# Patient Record
Sex: FEMALE | Race: WHITE | NOT HISPANIC OR LATINO | Employment: FULL TIME | URBAN - METROPOLITAN AREA
[De-identification: names, ages, dates, MRNs, and addresses within clinical notes are randomized per-mention and may not be internally consistent; named-entity substitution may affect disease eponyms.]

---

## 2017-03-22 LAB
EXTERNAL ABO GROUPING: NORMAL
EXTERNAL ANTIBODY SCREEN: NEGATIVE
EXTERNAL HEPATITIS B SURFACE ANTIGEN: NEGATIVE
EXTERNAL HEPATITIS C AB: NEGATIVE
EXTERNAL RH FACTOR: POSITIVE
EXTERNAL RUBELLA QUALITATIVE: NORMAL
EXTERNAL SYPHILIS RPR SCREEN: NORMAL
HIV1 P24 AG SERPL QL IA: NEGATIVE

## 2017-08-28 ENCOUNTER — TRANSCRIBE ORDERS (OUTPATIENT)
Dept: DIABETES SERVICES | Facility: HOSPITAL | Age: 32
End: 2017-08-28

## 2017-08-28 DIAGNOSIS — O24.410 DIET CONTROLLED GESTATIONAL DIABETES MELLITUS (GDM) IN THIRD TRIMESTER: Primary | ICD-10-CM

## 2017-08-30 ENCOUNTER — HOSPITAL ENCOUNTER (OUTPATIENT)
Dept: DIABETES SERVICES | Facility: HOSPITAL | Age: 32
Discharge: HOME | End: 2017-08-30
Admitting: NURSE PRACTITIONER

## 2017-08-30 PROCEDURE — G0108 DIAB MANAGE TRN  PER INDIV: HCPCS

## 2017-10-13 LAB — EXTERNAL GROUP B STREP ANTIGEN: NEGATIVE

## 2017-11-09 ENCOUNTER — ANESTHESIA EVENT (OUTPATIENT)
Dept: LABOR AND DELIVERY | Facility: HOSPITAL | Age: 32
End: 2017-11-09

## 2017-11-09 ENCOUNTER — HOSPITAL ENCOUNTER (INPATIENT)
Facility: HOSPITAL | Age: 32
LOS: 3 days | Discharge: HOME | End: 2017-11-12
Attending: OBSTETRICS & GYNECOLOGY | Admitting: OBSTETRICS & GYNECOLOGY

## 2017-11-09 ENCOUNTER — ANESTHESIA (OUTPATIENT)
Dept: LABOR AND DELIVERY | Facility: HOSPITAL | Age: 32
End: 2017-11-09

## 2017-11-09 PROBLEM — Z34.90 PREGNANCY: Status: ACTIVE | Noted: 2017-11-09

## 2017-11-09 LAB
ABO GROUP BLD: NORMAL
ALBUMIN SERPL-MCNC: 3.6 G/DL (ref 3.5–5.2)
ALBUMIN/GLOB SERPL: 1.1 G/DL
ALP SERPL-CCNC: 212 U/L (ref 39–117)
ALT SERPL W P-5'-P-CCNC: 17 U/L (ref 1–33)
ANION GAP SERPL CALCULATED.3IONS-SCNC: 14.6 MMOL/L
AST SERPL-CCNC: 21 U/L (ref 1–32)
ATMOSPHERIC PRESS: 753.8 MMHG
ATMOSPHERIC PRESS: 754.3 MMHG
BASE EXCESS BLDCOA CALC-SCNC: -2.1 MMOL/L
BASE EXCESS BLDCOV CALC-SCNC: -2.4 MMOL/L (ref -30–30)
BASOPHILS # BLD AUTO: 0.02 10*3/MM3 (ref 0–0.2)
BASOPHILS NFR BLD AUTO: 0.2 % (ref 0–1.5)
BDY SITE: ABNORMAL
BDY SITE: ABNORMAL
BILIRUB SERPL-MCNC: 0.2 MG/DL (ref 0.1–1.2)
BLD GP AB SCN SERPL QL: NEGATIVE
BUN BLD-MCNC: 15 MG/DL (ref 6–20)
BUN/CREAT SERPL: 18.1 (ref 7–25)
CALCIUM SPEC-SCNC: 9.5 MG/DL (ref 8.6–10.5)
CHLORIDE SERPL-SCNC: 102 MMOL/L (ref 98–107)
CO2 SERPL-SCNC: 22.4 MMOL/L (ref 22–29)
CREAT BLD-MCNC: 0.83 MG/DL (ref 0.57–1)
DEPRECATED RDW RBC AUTO: 43.7 FL (ref 37–54)
EOSINOPHIL # BLD AUTO: 0.1 10*3/MM3 (ref 0–0.7)
EOSINOPHIL NFR BLD AUTO: 1.2 % (ref 0.3–6.2)
ERYTHROCYTE [DISTWIDTH] IN BLOOD BY AUTOMATED COUNT: 12.9 % (ref 11.7–13)
GAS FLOW AIRWAY: 3 LPM
GAS FLOW AIRWAY: 3 LPM
GFR SERPL CREATININE-BSD FRML MDRD: 80 ML/MIN/1.73
GLOBULIN UR ELPH-MCNC: 3.2 GM/DL
GLUCOSE BLD-MCNC: 81 MG/DL (ref 65–99)
HCO3 BLDCOA-SCNC: 26.2 MMOL/L (ref 22–28)
HCO3 BLDCOV-SCNC: 23.6 MMOL/L
HCT VFR BLD AUTO: 42.8 % (ref 35.6–45.5)
HGB BLD-MCNC: 14.4 G/DL (ref 11.9–15.5)
IMM GRANULOCYTES # BLD: 0.02 10*3/MM3 (ref 0–0.03)
IMM GRANULOCYTES NFR BLD: 0.2 % (ref 0–0.5)
LYMPHOCYTES # BLD AUTO: 1.44 10*3/MM3 (ref 0.9–4.8)
LYMPHOCYTES NFR BLD AUTO: 17.4 % (ref 19.6–45.3)
MCH RBC QN AUTO: 31.5 PG (ref 26.9–32)
MCHC RBC AUTO-ENTMCNC: 33.6 G/DL (ref 32.4–36.3)
MCV RBC AUTO: 93.7 FL (ref 80.5–98.2)
MODALITY: ABNORMAL
MODALITY: ABNORMAL
MONOCYTES # BLD AUTO: 0.6 10*3/MM3 (ref 0.2–1.2)
MONOCYTES NFR BLD AUTO: 7.3 % (ref 5–12)
NEUTROPHILS # BLD AUTO: 6.08 10*3/MM3 (ref 1.9–8.1)
NEUTROPHILS NFR BLD AUTO: 73.7 % (ref 42.7–76)
PCO2 BLDCOA: 56.8 MMHG
PCO2 BLDCOV: 43.9 MM HG (ref 35–51.3)
PH BLDCOA: 7.27 PH UNITS (ref 7.18–7.34)
PH BLDCOV: 7.34 PH UNITS (ref 7.26–7.4)
PLATELET # BLD AUTO: 178 10*3/MM3 (ref 140–500)
PMV BLD AUTO: 11.8 FL (ref 6–12)
PO2 BLDCOA: <25.2 MMHG (ref 12–26)
PO2 BLDCOV: 26.3 MM HG (ref 19–39)
POTASSIUM BLD-SCNC: 3.6 MMOL/L (ref 3.5–5.2)
PROT SERPL-MCNC: 6.8 G/DL (ref 6–8.5)
RBC # BLD AUTO: 4.57 10*6/MM3 (ref 3.9–5.2)
RH BLD: POSITIVE
SAO2 % BLDCOA: 24.3 % (ref 92–99)
SAO2 % BLDCOA: 44.5 % (ref 92–99)
SAO2 % BLDCOV: ABNORMAL %
SODIUM BLD-SCNC: 139 MMOL/L (ref 136–145)
WBC NRBC COR # BLD: 8.26 10*3/MM3 (ref 4.5–10.7)

## 2017-11-09 PROCEDURE — 85025 COMPLETE CBC W/AUTO DIFF WBC: CPT | Performed by: OBSTETRICS & GYNECOLOGY

## 2017-11-09 PROCEDURE — 86901 BLOOD TYPING SEROLOGIC RH(D): CPT | Performed by: OBSTETRICS & GYNECOLOGY

## 2017-11-09 PROCEDURE — 25010000003 MORPHINE PER 10 MG: Performed by: NURSE ANESTHETIST, CERTIFIED REGISTERED

## 2017-11-09 PROCEDURE — 86900 BLOOD TYPING SEROLOGIC ABO: CPT | Performed by: OBSTETRICS & GYNECOLOGY

## 2017-11-09 PROCEDURE — 25010000002 ONDANSETRON PER 1 MG: Performed by: NURSE ANESTHETIST, CERTIFIED REGISTERED

## 2017-11-09 PROCEDURE — C1755 CATHETER, INTRASPINAL: HCPCS | Performed by: ANESTHESIOLOGY

## 2017-11-09 PROCEDURE — 25010000003 CEFAZOLIN IN DEXTROSE 2-4 GM/100ML-% SOLUTION

## 2017-11-09 PROCEDURE — 25010000002 PROMETHAZINE PER 50 MG: Performed by: NURSE ANESTHETIST, CERTIFIED REGISTERED

## 2017-11-09 PROCEDURE — 82803 BLOOD GASES ANY COMBINATION: CPT

## 2017-11-09 PROCEDURE — 86850 RBC ANTIBODY SCREEN: CPT | Performed by: OBSTETRICS & GYNECOLOGY

## 2017-11-09 PROCEDURE — 25010000002 TERBUTALINE PER 1 MG: Performed by: OBSTETRICS & GYNECOLOGY

## 2017-11-09 PROCEDURE — 80053 COMPREHEN METABOLIC PANEL: CPT | Performed by: OBSTETRICS & GYNECOLOGY

## 2017-11-09 RX ORDER — DIPHENHYDRAMINE HYDROCHLORIDE 50 MG/ML
12.5 INJECTION INTRAMUSCULAR; INTRAVENOUS EVERY 8 HOURS PRN
Status: DISCONTINUED | OUTPATIENT
Start: 2017-11-09 | End: 2017-11-09 | Stop reason: HOSPADM

## 2017-11-09 RX ORDER — PROMETHAZINE HYDROCHLORIDE 12.5 MG/1
12.5 TABLET ORAL ONCE AS NEEDED
Status: ACTIVE | OUTPATIENT
Start: 2017-11-09 | End: 2017-11-10

## 2017-11-09 RX ORDER — ASPIRIN 81 MG/1
81 TABLET ORAL DAILY
COMMUNITY
End: 2017-11-12 | Stop reason: HOSPADM

## 2017-11-09 RX ORDER — EPHEDRINE SULFATE 50 MG/ML
5 INJECTION, SOLUTION INTRAVENOUS AS NEEDED
Status: DISCONTINUED | OUTPATIENT
Start: 2017-11-09 | End: 2017-11-09 | Stop reason: HOSPADM

## 2017-11-09 RX ORDER — OXYTOCIN/RINGER'S LACTATE 10/500ML
125 PLASTIC BAG, INJECTION (ML) INTRAVENOUS CONTINUOUS PRN
Status: DISCONTINUED | OUTPATIENT
Start: 2017-11-09 | End: 2017-11-09 | Stop reason: HOSPADM

## 2017-11-09 RX ORDER — PROMETHAZINE HYDROCHLORIDE 25 MG/1
25 SUPPOSITORY RECTAL ONCE AS NEEDED
Status: DISCONTINUED | OUTPATIENT
Start: 2017-11-09 | End: 2017-11-12 | Stop reason: HOSPADM

## 2017-11-09 RX ORDER — PROMETHAZINE HYDROCHLORIDE 25 MG/ML
12.5 INJECTION, SOLUTION INTRAMUSCULAR; INTRAVENOUS ONCE AS NEEDED
Status: DISCONTINUED | OUTPATIENT
Start: 2017-11-09 | End: 2017-11-12 | Stop reason: HOSPADM

## 2017-11-09 RX ORDER — OXYCODONE HYDROCHLORIDE AND ACETAMINOPHEN 5; 325 MG/1; MG/1
1 TABLET ORAL EVERY 4 HOURS PRN
Status: DISCONTINUED | OUTPATIENT
Start: 2017-11-09 | End: 2017-11-09 | Stop reason: HOSPADM

## 2017-11-09 RX ORDER — MINERAL OIL
OIL (ML) MISCELLANEOUS ONCE
Status: DISCONTINUED | OUTPATIENT
Start: 2017-11-09 | End: 2017-11-09 | Stop reason: HOSPADM

## 2017-11-09 RX ORDER — LANOLIN 100 %
OINTMENT (GRAM) TOPICAL
Status: DISCONTINUED | OUTPATIENT
Start: 2017-11-09 | End: 2017-11-12 | Stop reason: HOSPADM

## 2017-11-09 RX ORDER — TERBUTALINE SULFATE 1 MG/ML
0.25 INJECTION, SOLUTION SUBCUTANEOUS AS NEEDED
Status: DISCONTINUED | OUTPATIENT
Start: 2017-11-09 | End: 2017-11-09 | Stop reason: HOSPADM

## 2017-11-09 RX ORDER — NALOXONE HCL 0.4 MG/ML
0.2 VIAL (ML) INJECTION AS NEEDED
Status: DISCONTINUED | OUTPATIENT
Start: 2017-11-09 | End: 2017-11-12 | Stop reason: HOSPADM

## 2017-11-09 RX ORDER — PROMETHAZINE HYDROCHLORIDE 12.5 MG/1
12.5 SUPPOSITORY RECTAL EVERY 6 HOURS PRN
Status: DISCONTINUED | OUTPATIENT
Start: 2017-11-09 | End: 2017-11-09 | Stop reason: HOSPADM

## 2017-11-09 RX ORDER — DOCUSATE SODIUM 100 MG/1
100 CAPSULE, LIQUID FILLED ORAL 2 TIMES DAILY PRN
Status: DISCONTINUED | OUTPATIENT
Start: 2017-11-09 | End: 2017-11-12 | Stop reason: HOSPADM

## 2017-11-09 RX ORDER — HYDROCODONE BITARTRATE AND ACETAMINOPHEN 7.5; 325 MG/1; MG/1
1 TABLET ORAL ONCE AS NEEDED
Status: CANCELLED | OUTPATIENT
Start: 2017-11-09 | End: 2017-11-10

## 2017-11-09 RX ORDER — LIDOCAINE HYDROCHLORIDE 15 MG/ML
INJECTION, SOLUTION EPIDURAL; INFILTRATION; INTRACAUDAL; PERINEURAL AS NEEDED
Status: DISCONTINUED | OUTPATIENT
Start: 2017-11-09 | End: 2017-11-09 | Stop reason: SURG

## 2017-11-09 RX ORDER — ONDANSETRON 4 MG/1
4 TABLET, FILM COATED ORAL EVERY 8 HOURS PRN
Status: DISCONTINUED | OUTPATIENT
Start: 2017-11-09 | End: 2017-11-12 | Stop reason: HOSPADM

## 2017-11-09 RX ORDER — LIDOCAINE HYDROCHLORIDE 20 MG/ML
INJECTION, SOLUTION EPIDURAL; INFILTRATION; INTRACAUDAL; PERINEURAL AS NEEDED
Status: DISCONTINUED | OUTPATIENT
Start: 2017-11-09 | End: 2017-11-09 | Stop reason: SURG

## 2017-11-09 RX ORDER — ONDANSETRON 2 MG/ML
INJECTION INTRAMUSCULAR; INTRAVENOUS AS NEEDED
Status: DISCONTINUED | OUTPATIENT
Start: 2017-11-09 | End: 2017-11-09 | Stop reason: SURG

## 2017-11-09 RX ORDER — DIPHENHYDRAMINE HCL 25 MG
25 CAPSULE ORAL NIGHTLY PRN
Status: DISCONTINUED | OUTPATIENT
Start: 2017-11-09 | End: 2017-11-09 | Stop reason: HOSPADM

## 2017-11-09 RX ORDER — DEXTROSE, SODIUM CHLORIDE, SODIUM LACTATE, POTASSIUM CHLORIDE, AND CALCIUM CHLORIDE 5; .6; .31; .03; .02 G/100ML; G/100ML; G/100ML; G/100ML; G/100ML
125 INJECTION, SOLUTION INTRAVENOUS CONTINUOUS
Status: DISCONTINUED | OUTPATIENT
Start: 2017-11-09 | End: 2017-11-09

## 2017-11-09 RX ORDER — ONDANSETRON 4 MG/1
4 TABLET, ORALLY DISINTEGRATING ORAL EVERY 6 HOURS PRN
Status: DISCONTINUED | OUTPATIENT
Start: 2017-11-09 | End: 2017-11-09 | Stop reason: HOSPADM

## 2017-11-09 RX ORDER — METHYLERGONOVINE MALEATE 0.2 MG/ML
200 INJECTION INTRAVENOUS ONCE AS NEEDED
Status: DISCONTINUED | OUTPATIENT
Start: 2017-11-09 | End: 2017-11-09 | Stop reason: HOSPADM

## 2017-11-09 RX ORDER — OXYCODONE HYDROCHLORIDE AND ACETAMINOPHEN 5; 325 MG/1; MG/1
2 TABLET ORAL EVERY 4 HOURS PRN
Status: DISCONTINUED | OUTPATIENT
Start: 2017-11-09 | End: 2017-11-09 | Stop reason: HOSPADM

## 2017-11-09 RX ORDER — PROMETHAZINE HYDROCHLORIDE 25 MG/ML
12.5 INJECTION, SOLUTION INTRAMUSCULAR; INTRAVENOUS EVERY 6 HOURS PRN
Status: DISCONTINUED | OUTPATIENT
Start: 2017-11-09 | End: 2017-11-09 | Stop reason: HOSPADM

## 2017-11-09 RX ORDER — IBUPROFEN 600 MG/1
600 TABLET ORAL EVERY 8 HOURS PRN
Status: DISCONTINUED | OUTPATIENT
Start: 2017-11-09 | End: 2017-11-11

## 2017-11-09 RX ORDER — ACETAMINOPHEN 500 MG
1000 TABLET ORAL EVERY 4 HOURS PRN
Status: DISCONTINUED | OUTPATIENT
Start: 2017-11-09 | End: 2017-11-09 | Stop reason: HOSPADM

## 2017-11-09 RX ORDER — OXYTOCIN/RINGER'S LACTATE 10/500ML
2-20 PLASTIC BAG, INJECTION (ML) INTRAVENOUS
Status: DISCONTINUED | OUTPATIENT
Start: 2017-11-09 | End: 2017-11-09

## 2017-11-09 RX ORDER — ERYTHROMYCIN 5 MG/G
OINTMENT OPHTHALMIC
Status: DISPENSED
Start: 2017-11-09 | End: 2017-11-10

## 2017-11-09 RX ORDER — CEFAZOLIN SODIUM 2 G/100ML
INJECTION, SOLUTION INTRAVENOUS
Status: COMPLETED
Start: 2017-11-09 | End: 2017-11-09

## 2017-11-09 RX ORDER — SODIUM CHLORIDE 9 MG/ML
100 INJECTION, SOLUTION INTRAVENOUS CONTINUOUS
Status: DISCONTINUED | OUTPATIENT
Start: 2017-11-09 | End: 2017-11-09

## 2017-11-09 RX ORDER — FLUMAZENIL 0.1 MG/ML
0.2 INJECTION INTRAVENOUS AS NEEDED
Status: CANCELLED | OUTPATIENT
Start: 2017-11-09

## 2017-11-09 RX ORDER — PRENATAL VIT NO.126/IRON/FOLIC 28MG-0.8MG
1 TABLET ORAL
COMMUNITY

## 2017-11-09 RX ORDER — OXYCODONE AND ACETAMINOPHEN 7.5; 325 MG/1; MG/1
1 TABLET ORAL EVERY 4 HOURS PRN
Status: DISCONTINUED | OUTPATIENT
Start: 2017-11-09 | End: 2017-11-12 | Stop reason: HOSPADM

## 2017-11-09 RX ORDER — ONDANSETRON 2 MG/ML
4 INJECTION INTRAMUSCULAR; INTRAVENOUS ONCE AS NEEDED
Status: DISCONTINUED | OUTPATIENT
Start: 2017-11-09 | End: 2017-11-09 | Stop reason: HOSPADM

## 2017-11-09 RX ORDER — FAMOTIDINE 10 MG/ML
20 INJECTION, SOLUTION INTRAVENOUS ONCE AS NEEDED
Status: DISCONTINUED | OUTPATIENT
Start: 2017-11-09 | End: 2017-11-09 | Stop reason: HOSPADM

## 2017-11-09 RX ORDER — PROMETHAZINE HYDROCHLORIDE 25 MG/ML
INJECTION, SOLUTION INTRAMUSCULAR; INTRAVENOUS AS NEEDED
Status: DISCONTINUED | OUTPATIENT
Start: 2017-11-09 | End: 2017-11-09 | Stop reason: SURG

## 2017-11-09 RX ORDER — PROMETHAZINE HYDROCHLORIDE 25 MG/1
12.5 TABLET ORAL EVERY 6 HOURS PRN
Status: DISCONTINUED | OUTPATIENT
Start: 2017-11-09 | End: 2017-11-09 | Stop reason: HOSPADM

## 2017-11-09 RX ORDER — MISOPROSTOL 200 UG/1
800 TABLET ORAL AS NEEDED
Status: DISCONTINUED | OUTPATIENT
Start: 2017-11-09 | End: 2017-11-09 | Stop reason: HOSPADM

## 2017-11-09 RX ORDER — HYDROMORPHONE HYDROCHLORIDE 1 MG/ML
0.5 INJECTION, SOLUTION INTRAMUSCULAR; INTRAVENOUS; SUBCUTANEOUS
Status: CANCELLED | OUTPATIENT
Start: 2017-11-09

## 2017-11-09 RX ORDER — FAMOTIDINE 10 MG/ML
20 INJECTION, SOLUTION INTRAVENOUS EVERY 12 HOURS SCHEDULED
Status: DISCONTINUED | OUTPATIENT
Start: 2017-11-09 | End: 2017-11-09 | Stop reason: HOSPADM

## 2017-11-09 RX ORDER — HYDRALAZINE HYDROCHLORIDE 20 MG/ML
5 INJECTION INTRAMUSCULAR; INTRAVENOUS
Status: CANCELLED | OUTPATIENT
Start: 2017-11-09

## 2017-11-09 RX ORDER — DIPHENHYDRAMINE HYDROCHLORIDE 50 MG/ML
25 INJECTION INTRAMUSCULAR; INTRAVENOUS NIGHTLY PRN
Status: DISCONTINUED | OUTPATIENT
Start: 2017-11-09 | End: 2017-11-09 | Stop reason: HOSPADM

## 2017-11-09 RX ORDER — MORPHINE SULFATE 0.5 MG/ML
INJECTION, SOLUTION EPIDURAL; INTRATHECAL; INTRAVENOUS AS NEEDED
Status: DISCONTINUED | OUTPATIENT
Start: 2017-11-09 | End: 2017-11-09 | Stop reason: SURG

## 2017-11-09 RX ORDER — MINERAL OIL
30 OIL (ML) MISCELLANEOUS AS NEEDED
Status: DISCONTINUED | OUTPATIENT
Start: 2017-11-09 | End: 2017-11-09 | Stop reason: HOSPADM

## 2017-11-09 RX ORDER — ACETAMINOPHEN 325 MG/1
650 TABLET ORAL EVERY 4 HOURS PRN
Status: DISCONTINUED | OUTPATIENT
Start: 2017-11-09 | End: 2017-11-12 | Stop reason: HOSPADM

## 2017-11-09 RX ORDER — LABETALOL 100 MG/1
100 TABLET, FILM COATED ORAL 2 TIMES DAILY
COMMUNITY
End: 2017-11-12 | Stop reason: HOSPADM

## 2017-11-09 RX ORDER — ACETAMINOPHEN 650 MG/1
650 SUPPOSITORY RECTAL EVERY 4 HOURS PRN
Status: DISCONTINUED | OUTPATIENT
Start: 2017-11-09 | End: 2017-11-09 | Stop reason: HOSPADM

## 2017-11-09 RX ORDER — FENTANYL CITRATE 50 UG/ML
50 INJECTION, SOLUTION INTRAMUSCULAR; INTRAVENOUS
Status: CANCELLED | OUTPATIENT
Start: 2017-11-09

## 2017-11-09 RX ORDER — PHYTONADIONE 2 MG/ML
INJECTION, EMULSION INTRAMUSCULAR; INTRAVENOUS; SUBCUTANEOUS
Status: DISPENSED
Start: 2017-11-09 | End: 2017-11-10

## 2017-11-09 RX ORDER — SODIUM CHLORIDE 0.9 % (FLUSH) 0.9 %
1-10 SYRINGE (ML) INJECTION AS NEEDED
Status: DISCONTINUED | OUTPATIENT
Start: 2017-11-09 | End: 2017-11-09 | Stop reason: HOSPADM

## 2017-11-09 RX ORDER — ONDANSETRON 2 MG/ML
4 INJECTION INTRAMUSCULAR; INTRAVENOUS EVERY 6 HOURS PRN
Status: DISCONTINUED | OUTPATIENT
Start: 2017-11-09 | End: 2017-11-09 | Stop reason: HOSPADM

## 2017-11-09 RX ORDER — NALBUPHINE HCL 10 MG/ML
5 AMPUL (ML) INJECTION
Status: DISCONTINUED | OUTPATIENT
Start: 2017-11-09 | End: 2017-11-09 | Stop reason: HOSPADM

## 2017-11-09 RX ORDER — OXYCODONE AND ACETAMINOPHEN 7.5; 325 MG/1; MG/1
1 TABLET ORAL ONCE AS NEEDED
Status: CANCELLED | OUTPATIENT
Start: 2017-11-09 | End: 2017-11-10

## 2017-11-09 RX ORDER — MISOPROSTOL 200 UG/1
600 TABLET ORAL ONCE
Status: COMPLETED | OUTPATIENT
Start: 2017-11-09 | End: 2017-11-09

## 2017-11-09 RX ORDER — EPHEDRINE SULFATE 50 MG/ML
5 INJECTION, SOLUTION INTRAVENOUS ONCE AS NEEDED
Status: CANCELLED | OUTPATIENT
Start: 2017-11-09

## 2017-11-09 RX ORDER — LIDOCAINE HYDROCHLORIDE 10 MG/ML
5 INJECTION, SOLUTION INFILTRATION; PERINEURAL AS NEEDED
Status: DISCONTINUED | OUTPATIENT
Start: 2017-11-09 | End: 2017-11-09 | Stop reason: HOSPADM

## 2017-11-09 RX ORDER — PROMETHAZINE HYDROCHLORIDE 25 MG/1
25 TABLET ORAL ONCE AS NEEDED
Status: DISCONTINUED | OUTPATIENT
Start: 2017-11-09 | End: 2017-11-12 | Stop reason: HOSPADM

## 2017-11-09 RX ORDER — LABETALOL HYDROCHLORIDE 5 MG/ML
5 INJECTION, SOLUTION INTRAVENOUS
Status: CANCELLED | OUTPATIENT
Start: 2017-11-09

## 2017-11-09 RX ORDER — CEFAZOLIN SODIUM 2 G/100ML
2 INJECTION, SOLUTION INTRAVENOUS ONCE
Status: COMPLETED | OUTPATIENT
Start: 2017-11-09 | End: 2017-11-09

## 2017-11-09 RX ORDER — OXYCODONE HYDROCHLORIDE AND ACETAMINOPHEN 5; 325 MG/1; MG/1
1 TABLET ORAL EVERY 4 HOURS PRN
Status: DISCONTINUED | OUTPATIENT
Start: 2017-11-09 | End: 2017-11-12 | Stop reason: HOSPADM

## 2017-11-09 RX ORDER — OXYTOCIN/RINGER'S LACTATE 10/500ML
999 PLASTIC BAG, INJECTION (ML) INTRAVENOUS ONCE
Status: COMPLETED | OUTPATIENT
Start: 2017-11-09 | End: 2017-11-09

## 2017-11-09 RX ORDER — SODIUM CHLORIDE, SODIUM LACTATE, POTASSIUM CHLORIDE, CALCIUM CHLORIDE 600; 310; 30; 20 MG/100ML; MG/100ML; MG/100ML; MG/100ML
125 INJECTION, SOLUTION INTRAVENOUS CONTINUOUS
Status: DISCONTINUED | OUTPATIENT
Start: 2017-11-09 | End: 2017-11-09

## 2017-11-09 RX ORDER — ZOLPIDEM TARTRATE 5 MG/1
5 TABLET ORAL NIGHTLY PRN
Status: DISCONTINUED | OUTPATIENT
Start: 2017-11-09 | End: 2017-11-09 | Stop reason: HOSPADM

## 2017-11-09 RX ORDER — FAMOTIDINE 20 MG/1
20 TABLET, FILM COATED ORAL EVERY 12 HOURS SCHEDULED
Status: DISCONTINUED | OUTPATIENT
Start: 2017-11-09 | End: 2017-11-09 | Stop reason: HOSPADM

## 2017-11-09 RX ORDER — SIMETHICONE 80 MG
80 TABLET,CHEWABLE ORAL 4 TIMES DAILY PRN
Status: DISCONTINUED | OUTPATIENT
Start: 2017-11-09 | End: 2017-11-12 | Stop reason: HOSPADM

## 2017-11-09 RX ORDER — ONDANSETRON 4 MG/1
4 TABLET, FILM COATED ORAL EVERY 6 HOURS PRN
Status: DISCONTINUED | OUTPATIENT
Start: 2017-11-09 | End: 2017-11-09 | Stop reason: HOSPADM

## 2017-11-09 RX ORDER — CARBOPROST TROMETHAMINE 250 UG/ML
250 INJECTION, SOLUTION INTRAMUSCULAR AS NEEDED
Status: DISCONTINUED | OUTPATIENT
Start: 2017-11-09 | End: 2017-11-09 | Stop reason: HOSPADM

## 2017-11-09 RX ORDER — IBUPROFEN 600 MG/1
600 TABLET ORAL EVERY 6 HOURS PRN
Status: DISCONTINUED | OUTPATIENT
Start: 2017-11-09 | End: 2017-11-09 | Stop reason: HOSPADM

## 2017-11-09 RX ADMIN — OXYCODONE HYDROCHLORIDE AND ACETAMINOPHEN 1 TABLET: 5; 325 TABLET ORAL at 15:19

## 2017-11-09 RX ADMIN — CEFAZOLIN SODIUM 2 G: 2 INJECTION, SOLUTION INTRAVENOUS at 12:55

## 2017-11-09 RX ADMIN — TERBUTALINE SULFATE 0.25 MG: 1 INJECTION, SOLUTION SUBCUTANEOUS at 10:42

## 2017-11-09 RX ADMIN — ONDANSETRON 4 MG: 2 INJECTION INTRAMUSCULAR; INTRAVENOUS at 13:02

## 2017-11-09 RX ADMIN — LIDOCAINE HYDROCHLORIDE 3 ML: 15 INJECTION, SOLUTION EPIDURAL; INFILTRATION; INTRACAUDAL; PERINEURAL at 09:59

## 2017-11-09 RX ADMIN — LIDOCAINE HYDROCHLORIDE 4 ML: 15 INJECTION, SOLUTION EPIDURAL; INFILTRATION; INTRACAUDAL; PERINEURAL at 10:01

## 2017-11-09 RX ADMIN — OXYTOCIN 500 ML: 10 INJECTION, SOLUTION INTRAMUSCULAR; INTRAVENOUS at 13:46

## 2017-11-09 RX ADMIN — LIDOCAINE HYDROCHLORIDE 5 ML: 20 INJECTION, SOLUTION EPIDURAL; INFILTRATION; INTRACAUDAL; PERINEURAL at 12:53

## 2017-11-09 RX ADMIN — IBUPROFEN 600 MG: 600 TABLET ORAL at 15:18

## 2017-11-09 RX ADMIN — PROMETHAZINE HYDROCHLORIDE 6.25 MG: 25 INJECTION INTRAMUSCULAR; INTRAVENOUS at 13:28

## 2017-11-09 RX ADMIN — LIDOCAINE HYDROCHLORIDE 5 ML: 20 INJECTION, SOLUTION EPIDURAL; INFILTRATION; INTRACAUDAL; PERINEURAL at 12:51

## 2017-11-09 RX ADMIN — MORPHINE SULFATE 100 MCG: 0.5 INJECTION EPIDURAL; INTRATHECAL; INTRAVENOUS at 13:47

## 2017-11-09 RX ADMIN — OXYTOCIN 2 MILLI-UNITS/MIN: 10 INJECTION, SOLUTION INTRAMUSCULAR; INTRAVENOUS at 08:12

## 2017-11-09 RX ADMIN — MORPHINE SULFATE 100 MCG: 0.5 INJECTION EPIDURAL; INTRATHECAL; INTRAVENOUS at 13:31

## 2017-11-09 RX ADMIN — Medication 10 ML/HR: at 10:06

## 2017-11-09 RX ADMIN — SODIUM CHLORIDE, POTASSIUM CHLORIDE, SODIUM LACTATE AND CALCIUM CHLORIDE 125 ML/HR: 600; 310; 30; 20 INJECTION, SOLUTION INTRAVENOUS at 06:25

## 2017-11-09 RX ADMIN — TERBUTALINE SULFATE 0.25 MG: 1 INJECTION, SOLUTION SUBCUTANEOUS at 12:20

## 2017-11-09 RX ADMIN — LIDOCAINE HYDROCHLORIDE 5 ML: 20 INJECTION, SOLUTION EPIDURAL; INFILTRATION; INTRACAUDAL; PERINEURAL at 12:48

## 2017-11-09 RX ADMIN — SODIUM CHLORIDE, POTASSIUM CHLORIDE, SODIUM LACTATE AND CALCIUM CHLORIDE: 600; 310; 30; 20 INJECTION, SOLUTION INTRAVENOUS at 13:52

## 2017-11-09 RX ADMIN — MISOPROSTOL 600 MCG: 200 TABLET ORAL at 22:05

## 2017-11-09 RX ADMIN — MORPHINE SULFATE 100 MCG: 0.5 INJECTION EPIDURAL; INTRATHECAL; INTRAVENOUS at 13:22

## 2017-11-09 RX ADMIN — OXYTOCIN 500 ML: 10 INJECTION, SOLUTION INTRAMUSCULAR; INTRAVENOUS at 13:14

## 2017-11-09 RX ADMIN — SODIUM CHLORIDE, POTASSIUM CHLORIDE, SODIUM LACTATE AND CALCIUM CHLORIDE 125 ML/HR: 600; 310; 30; 20 INJECTION, SOLUTION INTRAVENOUS at 11:03

## 2017-11-09 RX ADMIN — LIDOCAINE HYDROCHLORIDE 2 ML: 15 INJECTION, SOLUTION EPIDURAL; INFILTRATION; INTRACAUDAL; PERINEURAL at 10:03

## 2017-11-09 RX ADMIN — SODIUM CHLORIDE, POTASSIUM CHLORIDE, SODIUM LACTATE AND CALCIUM CHLORIDE 1000 ML: 600; 310; 30; 20 INJECTION, SOLUTION INTRAVENOUS at 09:29

## 2017-11-09 NOTE — PLAN OF CARE
Problem: Patient Care Overview (Adult)  Goal: Plan of Care Review  Outcome: Ongoing (interventions implemented as appropriate)    11/09/17 0651   Coping/Psychosocial Response Interventions   Plan Of Care Reviewed With patient;spouse   Patient Care Overview   Progress improving       Goal: Adult Individualization and Mutuality  Outcome: Ongoing (interventions implemented as appropriate)    11/09/17 0651   Individualization   Patient Specific Preferences Epidural, breastfeeding, LULA, fob to cut cord   Patient Specific Goals Pain management, LULA immediately following delivery, breastfeeding within one hour of delivery.   Patient Specific Interventions IV fluids, position changes   Mutuality/Individual Preferences   What Anxieties, Fears or Concerns Do You Have About Your Health or Care? denies   What Questions Do You Have About Your Health or Care? Questions about epidural addressed.   What Information Would Help Us Give You More Personalized Care? Inclusion in plan of care       Goal: Discharge Needs Assessment  Outcome: Ongoing (interventions implemented as appropriate)    11/09/17 0651   Discharge Needs Assessment   Concerns To Be Addressed no discharge needs identified   Readmission Within The Last 30 Days no previous admission in last 30 days   Equipment Needed After Discharge none   Discharge Disposition home or self-care   Current Health   Outpatient/Agency/Support Group Needs physician   Anticipated Changes Related to Illness none   Self-Care   Equipment Currently Used at Home none   Living Environment   Transportation Available car         Problem: Labor (Cervical Ripen, Induct, Augment) (Adult,Obstetrics,Pediatric)  Goal: Signs and Symptoms of Listed Potential Problems Will be Absent or Manageable (Labor)  Outcome: Ongoing (interventions implemented as appropriate)    11/09/17 0651   Labor (Cervical Ripen, Induct, Augment)   Problems Assessed (Labor) all   Problems Present (Labor) pain

## 2017-11-09 NOTE — H&P
[facility]  Obstetric Preop History and Physical:    Patient Name: Magno Maria  :  1985  MRN:  [id number]      Chief Complaint   Patient presents with   • Rupture of Membranes     States had gush of fluid at home. Membranes felt intact on admission but mod amt bloody show with cervical change since office visit        Subjective                32 y.o. female  currently at 40w2d, who presented in early labor then progress to active labor, but multiple episodes of deep variables, followed by bradycardic episode and now minimal variability.              Past OB History:       Obstetric History       T0      L0     SAB0   TAB0   Ectopic0   Multiple0   Live Births0       # Outcome Date GA Lbr Azam/2nd Weight Sex Delivery Anes PTL Lv   1 Current                   Past Medical History: Past Medical History:   Diagnosis Date   • Abnormal ECG    • Abnormal Pap smear of cervix    • Anxiety    • Gestational diabetes    • Gestational hypertension       Past Surgical History Past Surgical History:   Procedure Laterality Date   • WISDOM TOOTH EXTRACTION        Family History: Family History   Problem Relation Age of Onset   • Diabetes Father    • Hypertension Father    • Hyperlipidemia Father       Social History:  reports that she has never smoked. She has never used smokeless tobacco.   reports that she drinks about 0.6 oz of alcohol per week    reports that she does not use illicit drugs.    Allergies:     Review of patient's allergies indicates no known allergies.     Objective       Vital Signs Range for the last 24 hours  Temperature: Temp:  [97.9 °F (36.6 °C)-99 °F (37.2 °C)] 98.9 °F (37.2 °C)   Temp Source: Temp src: Oral   BP: BP: (104-156)/(59-92) 156/79   Pulse: Heart Rate:  [] 104   Respirations: Resp:  [18] 18   SPO2: SpO2:  [98 %-99 %] 98 %        fentaNYL (2 mcg/ml) and ropivacaine (0.2%) in 250 ml (PREMIX) 12 mL/hr Last Rate: 10 mL/hr (17 1006)   lactated ringers 125 mL/hr  Last Rate: 125 mL/hr (17 1103)   oxytocin 125 mL/hr    oxytocin 2-20 jay jay-units/min Last Rate: Stopped (17 1040)   sodium chloride 100 mL/hr                       Physical Exam:  General: Alert in NAD   Heart Normal rate and regular rhythm   Lungs Clear to auscultation bilaterally     Abdomen Gravid, soft, no masses   Extremities mild edema         Cervix: Exam by: Method: sterile exam per physician   Dilation: Dilation: 7   Effacement: Cervical Effacement: 90%   Station: Station: -1                             FHR:   TOCO: 140's with minimal variability  Reg contractions         Assessment/Plan     Assessment: IUP @ 40  with NRFHT's remote from delivery.    Plan: Recommend proceeding with  section for delivery. Indications for proceeding, risks and benefits have been discussed with patient and . All questions answered.         Radha Loaiza MD  2017  12:45 PM

## 2017-11-09 NOTE — L&D DELIVERY NOTE
[facility]   Section Operative Note    Pre-Operative Dx:   1.  IUP at 40w2d weeks   2. Fetal Intolerance of Labor NRFHT's remote from delivery        Postoperative Dx:  Same        Procedure: , Low Transverse    Surgeon: Radha Loaiza     Assistant: Dr Moffett   Anesthesia: Epidural    EBL: 800cc         Specimens: * No orders in the log *     Findings:                           Amniotic Fluid clear  Uterus normal  Tubes and ovaries normal bilaterally              Infant:            Gender: Viable male  infant     Weight: 7 lb 14.1 oz (3.575 kg)     Apgars: 9   @ 1 minute /     9   @ 5 minutes                 Indication for C/Section:     Fetal Intolerance of Labor      suspected placental abruption occuring         Procedure Details:  The patient was taken to the operating room where epidural anesthesia was found to be adequate. She was prepped and draped in the usual sterile manner in the dorsal supine position with leftward tilt. A Pfannenstiel incision was made and carried down to the fascia. The fascia was incised in the mid-line and extended transversely with Reza scissors. The fascia was grasped and elevated and  from the underlying rectus muscles superiorly and inferiorly. The peritoneum was identified and entered. Peritoneal incision was extended superiorly and inferiorly with good visualization of the bladder. The bladder blade was inserted and the vesicouterine peritoneum was incised transversely and the bladder flap was created digitally. The bladder blade was reinserted. The  lower uterine segment was incised in a transverse fashion.  The head was elevated and delivered through the incision. The remainder of the infant was delivered without difficulty. The umbilical cord was clamped and cut and the infant was handed off the field. Cord ph and cord bloods were obtained. The placenta was removed intact and appeared normal. The uterine incision was closed with running  locked sutures of 0 monocryl. Second layer closure was placed imbricating the first for hemostasis. The abdominal cavity was irrigated and cleared of all clot and debris. The uterine incision was inspected and noted to be hemostatic. Rectus muscles were reapproximated in the midline with 0 chromic.  The fascia was closed with 0 PDS in running continuous fashion. The subcutaneous tissue was closed with 3-0 chromic. The skin was closed in subcuticular fashion with 4-0 Monocryl.   Instrument, sponge, and needle counts were correct prior the abdominal closure and at the conclusion of the case. Mother and baby  to recovery room in stable condition.              Complications:     None          Antibiotics: cefazolin (Ancef)                      Radha Loaiza MD  11/13/2017  7:53 AM

## 2017-11-09 NOTE — ANESTHESIA POSTPROCEDURE EVALUATION
"Patient: Magno Maria    Procedure Summary     Date Anesthesia Start Anesthesia Stop Room / Location    17 0951 1400  [facility]  /  [facility]       Procedure Diagnosis Surgeon Provider     SECTION PRIMARY (N/A Abdomen) No diagnosis on file. MD Elfego Miranda MD          Anesthesia Type: epidural  Last vitals  BP   118/64 (17 1521)   Temp   36.6 °C (97.8 °F) (17 1409)   Pulse   80 (17 1531)   Resp   16 (17 1515)     SpO2   99 % (17 1531)     Post Anesthesia Care and Evaluation    Patient location during evaluation: PACU  Patient participation: complete - patient participated  Level of consciousness: sleepy but conscious  Pain score: 0  Pain management: adequate  Airway patency: patent  Anesthetic complications: No anesthetic complications    Cardiovascular status: acceptable  Respiratory status: acceptable  Hydration status: acceptable    Comments: /64  Pulse 80  Temp 36.6 °C (97.8 °F) (Oral)   Resp 16  Ht 60\" (152.4 cm)  Wt 145 lb (65.8 kg)  SpO2 99%  Breastfeeding? Yes  BMI 28.32 kg/m2        "

## 2017-11-09 NOTE — ANESTHESIA PREPROCEDURE EVALUATION
Anesthesia Evaluation     Patient summary reviewed and Nursing notes reviewed          Airway   Dental      Pulmonary - negative pulmonary ROS   Cardiovascular - negative cardio ROS        Neuro/Psych  (+) psychiatric history Anxiety,    GI/Hepatic/Renal/Endo    (+)  diabetes mellitus,     Musculoskeletal (-) negative ROS    Abdominal    Substance History - negative use     OB/GYN    (+) Pregnant, pregnancy induced hypertension        Other - negative ROS                                       Anesthesia Plan    ASA 3     epidural     Anesthetic plan and risks discussed with patient.

## 2017-11-09 NOTE — ANESTHESIA PROCEDURE NOTES
Labor Epidural    Patient location during procedure: OB  Performed By  Anesthesiologist: AIDEE SANCHEZ  Preanesthetic Checklist  Completed: patient identified, site marked, surgical consent, pre-op evaluation, timeout performed, IV checked, risks and benefits discussed and monitors and equipment checked  Prep:  Pt Position:sitting  Sterile Tech:cap, gloves, mask and sterile barrier  Prep:chlorhexidine gluconate and isopropyl alcohol  Monitoring:blood pressure monitoring, continuous pulse oximetry and EKG  Epidural Block Procedure:  Approach:midline  Guidance:landmark technique and palpation technique  Location:L3-L4  Needle Type:Tuohy  Needle Gauge:17  Loss of Resistance Medium: air  Paresthesia: none  Aspiration:negative  Test Dose:negative  Number of Attempts: 1  Post Assessment:  Dressing:occlusive dressing applied and secured with tape  Pt Tolerance:patient tolerated the procedure well with no apparent complications  Complications:no

## 2017-11-10 PROBLEM — O10.919 CHRONIC HYPERTENSION IN PREGNANCY: Status: ACTIVE | Noted: 2017-11-10

## 2017-11-10 PROBLEM — Z34.90 PREGNANCY: Status: RESOLVED | Noted: 2017-11-09 | Resolved: 2017-11-10

## 2017-11-10 PROBLEM — O24.419 GESTATIONAL DIABETES: Status: ACTIVE | Noted: 2017-11-10

## 2017-11-10 LAB
BASOPHILS # BLD AUTO: 0.02 10*3/MM3 (ref 0–0.2)
BASOPHILS NFR BLD AUTO: 0.2 % (ref 0–1.5)
DEPRECATED RDW RBC AUTO: 45.7 FL (ref 37–54)
EOSINOPHIL # BLD AUTO: 0.07 10*3/MM3 (ref 0–0.7)
EOSINOPHIL NFR BLD AUTO: 0.6 % (ref 0.3–6.2)
ERYTHROCYTE [DISTWIDTH] IN BLOOD BY AUTOMATED COUNT: 13.1 % (ref 11.7–13)
GLUCOSE BLDC GLUCOMTR-MCNC: 103 MG/DL (ref 70–130)
GLUCOSE BLDC GLUCOMTR-MCNC: 123 MG/DL (ref 70–130)
HCT VFR BLD AUTO: 34 % (ref 35.6–45.5)
HGB BLD-MCNC: 11.5 G/DL (ref 11.9–15.5)
IMM GRANULOCYTES # BLD: 0 10*3/MM3 (ref 0–0.03)
IMM GRANULOCYTES NFR BLD: 0 % (ref 0–0.5)
LYMPHOCYTES # BLD AUTO: 1.21 10*3/MM3 (ref 0.9–4.8)
LYMPHOCYTES NFR BLD AUTO: 10.8 % (ref 19.6–45.3)
MCH RBC QN AUTO: 32.3 PG (ref 26.9–32)
MCHC RBC AUTO-ENTMCNC: 33.8 G/DL (ref 32.4–36.3)
MCV RBC AUTO: 95.5 FL (ref 80.5–98.2)
MONOCYTES # BLD AUTO: 0.77 10*3/MM3 (ref 0.2–1.2)
MONOCYTES NFR BLD AUTO: 6.9 % (ref 5–12)
NEUTROPHILS # BLD AUTO: 9.15 10*3/MM3 (ref 1.9–8.1)
NEUTROPHILS NFR BLD AUTO: 81.5 % (ref 42.7–76)
PLATELET # BLD AUTO: 162 10*3/MM3 (ref 140–500)
PMV BLD AUTO: 11.7 FL (ref 6–12)
RBC # BLD AUTO: 3.56 10*6/MM3 (ref 3.9–5.2)
WBC NRBC COR # BLD: 11.22 10*3/MM3 (ref 4.5–10.7)

## 2017-11-10 PROCEDURE — 82962 GLUCOSE BLOOD TEST: CPT

## 2017-11-10 PROCEDURE — 85025 COMPLETE CBC W/AUTO DIFF WBC: CPT | Performed by: OBSTETRICS & GYNECOLOGY

## 2017-11-10 RX ADMIN — SIMETHICONE CHEW TAB 80 MG 80 MG: 80 TABLET ORAL at 08:43

## 2017-11-10 RX ADMIN — IBUPROFEN 600 MG: 600 TABLET ORAL at 23:38

## 2017-11-10 RX ADMIN — OXYCODONE HYDROCHLORIDE AND ACETAMINOPHEN 1 TABLET: 5; 325 TABLET ORAL at 08:46

## 2017-11-10 RX ADMIN — IBUPROFEN 600 MG: 600 TABLET ORAL at 15:07

## 2017-11-10 RX ADMIN — DOCUSATE SODIUM 100 MG: 100 CAPSULE, LIQUID FILLED ORAL at 08:43

## 2017-11-10 RX ADMIN — Medication: at 04:38

## 2017-11-10 RX ADMIN — OXYCODONE HYDROCHLORIDE AND ACETAMINOPHEN 1 TABLET: 5; 325 TABLET ORAL at 15:07

## 2017-11-10 RX ADMIN — OXYCODONE HYDROCHLORIDE AND ACETAMINOPHEN 1 TABLET: 5; 325 TABLET ORAL at 23:38

## 2017-11-10 RX ADMIN — IBUPROFEN 600 MG: 600 TABLET ORAL at 04:38

## 2017-11-10 NOTE — PROGRESS NOTES
[facility]   PROGRESS NOTE    Patient Name: Magno Maria  :  1985  MRN:  [id number]      Post-Op Day 1 S/P    Delivered a male infant.  Subjective     Patient reports:    Pain is well controlled. Voiding and ambulating without difficulty.    Tolerating po. Lochia normal.       The patient plans to breastfeed.         Objective       Vitals: Vital Signs Range for the last 24 hours  Temperature: Temp:  [97 °F (36.1 °C)-99.5 °F (37.5 °C)] 98.3 °F (36.8 °C)   Temp Source: Temp src: Oral   BP: BP: (0-159)/(0-90) 105/68   Pulse: Heart Rate:  [] 80   Respirations: Resp:  [16-18] 16         Intake/Output Summary (Last 24 hours) at 11/10/17 0931  Last data filed at 11/10/17 0800   Gross per 24 hour   Intake             1703 ml   Output             5160 ml   Net            -3457 ml                                              Physical Exam     General Alert and awake, in NAD      CV Regular rate and rhythm      Lungs clear to auscultation bilaterally        Abdomen Soft, non-distended, fundus firm,  1 below umbilicus, appropriately tender      Incision  Dressing clean, dry and intact.      Extremities  tr edema, calves NT               LABS: Lab Results   Component Value Date    WBC 11.22 (H) 11/10/2017    HGB 11.5 (L) 11/10/2017    HCT 34.0 (L) 11/10/2017    MCV 95.5 11/10/2017     11/10/2017     Prenatal labs results reviewed:  Yes   Rubella:  Immune  Rh Status:  RH type   Date Value Ref Range Status   2017 Positive  Final                       Assessment/Plan   : 1. POD 1 S/P C/S: Hemodynamically stable.  Doing well.  Continue routine care.      Desires circ for baby boy-- d/w     GDM-- d/w monitoring today     CHTN-- on labetalol      Active Problems:    Gestational diabetes    Chronic hypertension in pregnancy          Karlene Coombs, HUBER  11/10/2017  9:31 AM

## 2017-11-10 NOTE — LACTATION NOTE
This note was copied from a baby's chart.  P1 term, BF going well per Mom with wet/BM. Baby is 22 hrs old, getting circumcision now. Encouraged STS and feeding on cue or every 3 hrs. Reviewed Spectra pump with her.

## 2017-11-10 NOTE — PLAN OF CARE
Problem: Patient Care Overview (Adult)  Goal: Plan of Care Review  Outcome: Ongoing (interventions implemented as appropriate)    17   Coping/Psychosocial Response Interventions   Plan Of Care Reviewed With patient   Patient Care Overview   Progress improving   Outcome Evaluation   Outcome Summary/Follow up Plan patient had c/s for suspeced placental abruption and fetal intolerance of labor. mom and baby did great throughout c/s and recovery. mother and baby transfered to postpartum.        Goal: Adult Individualization and Mutuality  Outcome: Ongoing (interventions implemented as appropriate)  Goal: Discharge Needs Assessment  Outcome: Ongoing (interventions implemented as appropriate)    Problem: Postpartum ( Delivery) (Adult)  Goal: Signs and Symptoms of Listed Potential Problems Will be Absent or Manageable (Postpartum)  Outcome: Ongoing (interventions implemented as appropriate)

## 2017-11-10 NOTE — CONSULTS
Diabetes Education  Assessment/Teaching    Patient Name:  Magno Maria  YOB: 1985  MRN: [id number]  Admit Date:  11/9/2017      Assessment Date:  11/10/2017       Most Recent Value    General Information      Referral From:  Database    Height  5' (1.524 m)    Height Method  Stated    Weight  145 lb (65.8 kg)    Weight Method  Standing scale    Pregnancy Assessment     Diabetes History     What type of diabetes do you have?  Gestational    Education Preferences     Nutrition Information     Assessment Topics     DM Goals     Healthy Eating - Goal  0-7 days from discharge    Being Active - Goal  0-7 days from discharge               Most Recent Value    DM Education Needs     Gestational  Postpartum follow up    Discharge Plan  Home    Motivation  Moderate    Teaching Method  Discussion    Patient Response  Verbalized understanding            Other Comments:  Encouraged a follow up with MD every year ,discussed increase risk of diabetes. Discussed maintaining a healthy lifestyle for pt and her family.        Electronically signed by:  Gladys Mesa RN  11/10/17 2:51 PM

## 2017-11-10 NOTE — PLAN OF CARE
Problem: Patient Care Overview (Adult)  Goal: Plan of Care Review  Outcome: Ongoing (interventions implemented as appropriate)    11/10/17 1597   Coping/Psychosocial Response Interventions   Plan Of Care Reviewed With patient   Patient Care Overview   Progress improving   Outcome Evaluation   Outcome Summary/Follow up Plan ambulating in the room, pain under control       Goal: Adult Individualization and Mutuality  Outcome: Ongoing (interventions implemented as appropriate)  Goal: Discharge Needs Assessment  Outcome: Ongoing (interventions implemented as appropriate)    Problem: Postpartum ( Delivery) (Adult)  Goal: Signs and Symptoms of Listed Potential Problems Will be Absent or Manageable (Postpartum)  Outcome: Ongoing (interventions implemented as appropriate)    Problem: Breastfeeding (Adult,NICU,[place],Obstetrics,Pediatric)  Goal: Signs and Symptoms of Listed Potential Problems Will be Absent or Manageable (Breastfeeding)  Outcome: Ongoing (interventions implemented as appropriate)

## 2017-11-10 NOTE — PLAN OF CARE
Problem: Patient Care Overview (Adult)  Goal: Plan of Care Review  Outcome: Ongoing (interventions implemented as appropriate)    11/10/17 0350   Coping/Psychosocial Response Interventions   Plan Of Care Reviewed With patient   Patient Care Overview   Progress progress toward functional goals as expected   Outcome Evaluation   Outcome Summary/Follow up Plan VSS, f/c will be d/c'd this am, pt had 2 episodes of moderate bleeding, cytotec 600mcg given, bleeding decreased to scant       Goal: Adult Individualization and Mutuality  Outcome: Ongoing (interventions implemented as appropriate)  Goal: Discharge Needs Assessment  Outcome: Ongoing (interventions implemented as appropriate)    Problem: Postpartum ( Delivery) (Adult)  Goal: Signs and Symptoms of Listed Potential Problems Will be Absent or Manageable (Postpartum)  Outcome: Ongoing (interventions implemented as appropriate)

## 2017-11-11 LAB
GLUCOSE BLDC GLUCOMTR-MCNC: 104 MG/DL (ref 70–130)
GLUCOSE BLDC GLUCOMTR-MCNC: 88 MG/DL (ref 70–130)

## 2017-11-11 PROCEDURE — 82962 GLUCOSE BLOOD TEST: CPT

## 2017-11-11 RX ORDER — IBUPROFEN 600 MG/1
600 TABLET ORAL EVERY 8 HOURS PRN
Status: DISCONTINUED | OUTPATIENT
Start: 2017-11-11 | End: 2017-11-12 | Stop reason: HOSPADM

## 2017-11-11 RX ADMIN — OXYCODONE HYDROCHLORIDE AND ACETAMINOPHEN 1 TABLET: 5; 325 TABLET ORAL at 11:48

## 2017-11-11 RX ADMIN — IBUPROFEN 600 MG: 600 TABLET ORAL at 11:52

## 2017-11-11 RX ADMIN — IBUPROFEN 600 MG: 600 TABLET ORAL at 21:41

## 2017-11-11 RX ADMIN — OXYCODONE HYDROCHLORIDE AND ACETAMINOPHEN 1 TABLET: 7.5; 325 TABLET ORAL at 18:07

## 2017-11-11 NOTE — PROGRESS NOTES
[facility]   PROGRESS NOTE    Patient Name: Magno Maria  :  1985  MRN:  [id number]      Post-Op Day 1 S/P      Subjective     Patient reports:    Pain is well controlled. Voiding and ambulating without difficulty. Tolerating po. Lochia normal.              Objective       Vitals: Vital Signs Range for the last 24 hours  Temperature: Temp:  [97.2 °F (36.2 °C)-98.3 °F (36.8 °C)] 97.5 °F (36.4 °C)   Temp Source: Temp src: Oral   BP: BP: (110-139)/(75-86) 110/75   Pulse: Heart Rate:  [76-93] 79   Respirations: Resp:  [16] 16         Intake/Output Summary (Last 24 hours) at 17 1058  Last data filed at 17 0500   Gross per 24 hour   Intake             3070 ml   Output             2060 ml   Net             1010 ml                                              Physical Exam     General Alert and awake, in NAD                Abdomen Soft, non-distended, fundus firm,  appropriately tender      Incision  Dressing clean, dry and intact.      Extremities  no edema, calves NT               LABS: Lab Results   Component Value Date    WBC 11.22 (H) 11/10/2017    HGB 11.5 (L) 11/10/2017    HCT 34.0 (L) 11/10/2017    MCV 95.5 11/10/2017     11/10/2017       Rh Status:  RH type   Date Value Ref Range Status   2017 Positive  Final                       Assessment/Plan   : 1. POD 2 S/P C/S: Hemodynamically stable.  Doing well.  Continue routine care.     Active Problems:    Gestational diabetes    Chronic hypertension in pregnancy          Floridalma Hassan MD  2017  10:58 AM

## 2017-11-11 NOTE — PLAN OF CARE
Problem: Patient Care Overview (Adult)  Goal: Plan of Care Review  Outcome: Ongoing (interventions implemented as appropriate)    17   Coping/Psychosocial Response Interventions   Plan Of Care Reviewed With patient;spouse   Patient Care Overview   Progress improving   Outcome Evaluation   Outcome Summary/Follow up Plan Vitals stable; pain well managed; breastfeeding well       Goal: Adult Individualization and Mutuality  Outcome: Ongoing (interventions implemented as appropriate)    17   Individualization   Patient Specific Preferences Breastfeeding   Patient Specific Goals Pain management       Goal: Discharge Needs Assessment  Outcome: Ongoing (interventions implemented as appropriate)    17   Discharge Needs Assessment   Concerns To Be Addressed no discharge needs identified   Readmission Within The Last 30 Days no previous admission in last 30 days   Discharge Disposition home or self-care         Problem: Postpartum ( Delivery) (Adult)  Goal: Signs and Symptoms of Listed Potential Problems Will be Absent or Manageable (Postpartum)  Outcome: Ongoing (interventions implemented as appropriate)    17   Postpartum ( Delivery)   Problems Assessed (Postpartum ) all   Problems Present (Postpartum ) pain         Problem: Breastfeeding (Adult,NICU,[place],Obstetrics,Pediatric)  Goal: Signs and Symptoms of Listed Potential Problems Will be Absent or Manageable (Breastfeeding)  Outcome: Ongoing (interventions implemented as appropriate)    17   Breastfeeding   Problems Assessed (Breastfeeding) all   Problems Present (Breastfeeding) none

## 2017-11-11 NOTE — PLAN OF CARE
Problem: Postpartum ( Delivery) (Adult)  Goal: Signs and Symptoms of Listed Potential Problems Will be Absent or Manageable (Postpartum)  Outcome: Ongoing (interventions implemented as appropriate)    Problem: Breastfeeding (Adult,NICU,,Obstetrics,Pediatric)  Goal: Signs and Symptoms of Listed Potential Problems Will be Absent or Manageable (Breastfeeding)  Outcome: Ongoing (interventions implemented as appropriate)

## 2017-11-12 VITALS
BODY MASS INDEX: 28.47 KG/M2 | HEIGHT: 60 IN | OXYGEN SATURATION: 99 % | WEIGHT: 145 LBS | RESPIRATION RATE: 16 BRPM | HEART RATE: 84 BPM | SYSTOLIC BLOOD PRESSURE: 122 MMHG | TEMPERATURE: 97.4 F | DIASTOLIC BLOOD PRESSURE: 81 MMHG

## 2017-11-12 PROBLEM — O24.419 GESTATIONAL DIABETES: Status: RESOLVED | Noted: 2017-11-10 | Resolved: 2017-11-12

## 2017-11-12 PROBLEM — O10.919 CHRONIC HYPERTENSION IN PREGNANCY: Status: RESOLVED | Noted: 2017-11-10 | Resolved: 2017-11-12

## 2017-11-12 RX ORDER — IBUPROFEN 600 MG/1
600 TABLET ORAL EVERY 8 HOURS PRN
Qty: 50 TABLET | Refills: 3 | Status: ON HOLD | OUTPATIENT
Start: 2017-11-12 | End: 2020-06-05

## 2017-11-12 RX ORDER — OXYCODONE HYDROCHLORIDE AND ACETAMINOPHEN 5; 325 MG/1; MG/1
1 TABLET ORAL EVERY 4 HOURS PRN
Qty: 30 TABLET | Refills: 0 | Status: SHIPPED | OUTPATIENT
Start: 2017-11-12 | End: 2017-11-19

## 2017-11-12 RX ADMIN — OXYCODONE HYDROCHLORIDE AND ACETAMINOPHEN 1 TABLET: 5; 325 TABLET ORAL at 00:50

## 2017-11-12 RX ADMIN — SIMETHICONE CHEW TAB 80 MG 80 MG: 80 TABLET ORAL at 00:53

## 2017-11-12 RX ADMIN — IBUPROFEN 600 MG: 600 TABLET ORAL at 05:59

## 2017-11-12 RX ADMIN — SIMETHICONE CHEW TAB 80 MG 80 MG: 80 TABLET ORAL at 09:52

## 2017-11-12 NOTE — LACTATION NOTE
This note was copied from a baby's chart.  Patient requested LC observation of latch as the right breast seems to have some pain at latching. There is no visible damage on either nipple. Milk is coming in and baby latched easily to left breast and began deep , rhythmic swallows right away. Baby does has short frenulum but has good oral elasticity . Mom has card for [facility]

## 2017-11-12 NOTE — PLAN OF CARE
Problem: Patient Care Overview (Adult)  Goal: Plan of Care Review  Outcome: Ongoing (interventions implemented as appropriate)    17   Coping/Psychosocial Response Interventions   Plan Of Care Reviewed With patient;spouse   Patient Care Overview   Progress improving   Outcome Evaluation   Outcome Summary/Follow up Plan Vitals stable; pain well controlled; breastfeeding well; anticipating d/c in the am       Goal: Adult Individualization and Mutuality  Outcome: Ongoing (interventions implemented as appropriate)    17   Individualization   Patient Specific Preferences Breastfeeding   Patient Specific Goals Pain management       Goal: Discharge Needs Assessment  Outcome: Ongoing (interventions implemented as appropriate)    17   Discharge Needs Assessment   Concerns To Be Addressed no discharge needs identified   Readmission Within The Last 30 Days no previous admission in last 30 days   Discharge Disposition home or self-care         Problem: Postpartum ( Delivery) (Adult)  Goal: Signs and Symptoms of Listed Potential Problems Will be Absent or Manageable (Postpartum)  Outcome: Ongoing (interventions implemented as appropriate)    17   Postpartum ( Delivery)   Problems Assessed (Postpartum ) all   Problems Present (Postpartum ) pain         Problem: Breastfeeding (Adult,NICU,[place],Obstetrics,Pediatric)  Goal: Signs and Symptoms of Listed Potential Problems Will be Absent or Manageable (Breastfeeding)  Outcome: Ongoing (interventions implemented as appropriate)    17   Breastfeeding   Problems Assessed (Breastfeeding) all   Problems Present (Breastfeeding) none

## 2017-11-12 NOTE — DISCHARGE SUMMARY
Date of Discharge:  2017    Discharge Diagnosis: Term pregnancy, labor, nonreassuring tracing, chronic hypertension    Presenting Problem/History of Present Illness  Pregnancy [Z34.90]       Hospital Course  Patient is a 32 y.o. female presented with labor but had nonreassuring tracing so was delivered by LTCS.      Procedures Performed  Procedure(s):   SECTION PRIMARY   circumcision      Condition on Discharge:  Post-Op Day 3 S/P   Subjective     Patient reports:    Doing well - ready for discharge. Pain well controlled. Tolerating po and having flatus. Voiding and ambulating without difficulty. Lochia normal.     Vital Signs  Temp:  [97.4 °F (36.3 °C)-98 °F (36.7 °C)] 97.4 °F (36.3 °C)  Heart Rate:  [42-89] 84  Resp:  [16] 16  BP: (122-130)/(81-88) 122/81    Physical Exam    Gen Alert and awake   Abdomen Soft, ND,  Fundus firm with minimal tenderness   Incision  Intact, without erythema or exudate   Extremities Calves NT bilaterally     Assessment/Plan ]   Assessment:  POD 3  -  Doing well. Stable for discharge.     Plan:    Instructions reviewed       Consults:   Consults     No orders found from 10/11/2017 to 11/10/2017.          Discharge Disposition  Home or Self Caregood    Discharge Medications   Magno Maria   Home Medication Instructions MARIAMA:[id number]    Printed on:17 0950   Medication Information                      ibuprofen (ADVIL,MOTRIN) 600 MG tablet  Take 1 tablet by mouth Every 8 (Eight) Hours As Needed for Mild Pain .             oxyCODONE-acetaminophen (PERCOCET) 5-325 MG per tablet  Take 1 tablet by mouth Every 4 (Four) Hours As Needed for Moderate Pain  for up to 7 days.             Prenatal Vit-Fe Fumarate-FA (PRENATAL, CLASSIC, VITAMIN) 28-0.8 MG tablet tablet  Take 1 tablet by mouth.                 The patient has been prescribed a controlled substance.  She has been counseled on the risks associated with using the medication.   The addictive potential of  this medication and alternatives were discussed carefully with this patient and she demonstrated understanding.  A VINH report has been obtained and reviewed.    Activity at Discharge:   Vaginal rest  Follow-up Appointments  6 weeks      Test Results Pending at Discharge  none     Mary Joyner MD  11/12/17  9:56 AM

## 2019-10-30 LAB
EXTERNAL HEPATITIS B SURFACE ANTIGEN: NEGATIVE
EXTERNAL HEPATITIS C AB: NORMAL
EXTERNAL RUBELLA QUALITATIVE: NORMAL
EXTERNAL SYPHILIS RPR SCREEN: NORMAL
HIV1 P24 AG SERPL QL IA: NORMAL

## 2020-05-18 LAB — EXTERNAL GROUP B STREP ANTIGEN: NEGATIVE

## 2020-06-05 ENCOUNTER — HOSPITAL ENCOUNTER (INPATIENT)
Facility: HOSPITAL | Age: 35
LOS: 2 days | Discharge: HOME | End: 2020-06-07
Attending: OBSTETRICS & GYNECOLOGY | Admitting: OBSTETRICS & GYNECOLOGY

## 2020-06-05 PROBLEM — O34.211 MATERNAL CARE DUE TO LOW TRANSVERSE UTERINE SCAR FROM PREVIOUS CESAREAN DELIVERY: Status: ACTIVE | Noted: 2020-06-05

## 2020-06-05 PROBLEM — Z86.19 H/O HERPES GENITALIS: Status: ACTIVE | Noted: 2020-06-05

## 2020-06-05 PROBLEM — Z34.90 PREGNANCY: Status: ACTIVE | Noted: 2020-06-05

## 2020-06-05 PROCEDURE — 87635 SARS-COV-2 COVID-19 AMP PRB: CPT | Performed by: OBSTETRICS & GYNECOLOGY

## 2020-06-05 PROCEDURE — 85025 COMPLETE CBC W/AUTO DIFF WBC: CPT | Performed by: OBSTETRICS & GYNECOLOGY

## 2020-06-05 PROCEDURE — 86900 BLOOD TYPING SEROLOGIC ABO: CPT | Performed by: OBSTETRICS & GYNECOLOGY

## 2020-06-05 PROCEDURE — 99201: CPT

## 2020-06-05 PROCEDURE — 86850 RBC ANTIBODY SCREEN: CPT | Performed by: OBSTETRICS & GYNECOLOGY

## 2020-06-05 PROCEDURE — 86901 BLOOD TYPING SEROLOGIC RH(D): CPT | Performed by: OBSTETRICS & GYNECOLOGY

## 2020-06-05 RX ORDER — ACETAMINOPHEN 325 MG/1
650 TABLET ORAL EVERY 4 HOURS PRN
Status: DISCONTINUED | OUTPATIENT
Start: 2020-06-05 | End: 2020-06-06 | Stop reason: HOSPADM

## 2020-06-05 RX ORDER — SODIUM CHLORIDE 0.9 % (FLUSH) 0.9 %
1-10 SYRINGE (ML) INJECTION AS NEEDED
Status: DISCONTINUED | OUTPATIENT
Start: 2020-06-05 | End: 2020-06-06 | Stop reason: HOSPADM

## 2020-06-05 RX ORDER — SODIUM CHLORIDE, SODIUM LACTATE, POTASSIUM CHLORIDE, CALCIUM CHLORIDE 600; 310; 30; 20 MG/100ML; MG/100ML; MG/100ML; MG/100ML
125 INJECTION, SOLUTION INTRAVENOUS CONTINUOUS
Status: DISCONTINUED | OUTPATIENT
Start: 2020-06-06 | End: 2020-06-06

## 2020-06-05 RX ORDER — ONDANSETRON 2 MG/ML
4 INJECTION INTRAMUSCULAR; INTRAVENOUS EVERY 6 HOURS PRN
Status: DISCONTINUED | OUTPATIENT
Start: 2020-06-05 | End: 2020-06-06 | Stop reason: HOSPADM

## 2020-06-05 RX ORDER — ONDANSETRON 4 MG/1
4 TABLET, FILM COATED ORAL EVERY 8 HOURS PRN
Status: DISCONTINUED | OUTPATIENT
Start: 2020-06-05 | End: 2020-06-06 | Stop reason: HOSPADM

## 2020-06-05 RX ORDER — TERBUTALINE SULFATE 1 MG/ML
0.25 INJECTION, SOLUTION SUBCUTANEOUS AS NEEDED
Status: DISCONTINUED | OUTPATIENT
Start: 2020-06-05 | End: 2020-06-06 | Stop reason: HOSPADM

## 2020-06-05 RX ORDER — VALACYCLOVIR HYDROCHLORIDE 500 MG/1
500 TABLET, FILM COATED ORAL ONCE
COMMUNITY

## 2020-06-05 RX ADMIN — SODIUM CHLORIDE, POTASSIUM CHLORIDE, SODIUM LACTATE AND CALCIUM CHLORIDE 125 ML/HR: 600; 310; 30; 20 INJECTION, SOLUTION INTRAVENOUS at 23:56

## 2020-06-06 ENCOUNTER — ANESTHESIA (OUTPATIENT)
Dept: LABOR AND DELIVERY | Facility: HOSPITAL | Age: 35
End: 2020-06-06

## 2020-06-06 ENCOUNTER — ANESTHESIA EVENT (OUTPATIENT)
Dept: LABOR AND DELIVERY | Facility: HOSPITAL | Age: 35
End: 2020-06-06

## 2020-06-06 LAB
ABO GROUP BLD: NORMAL
BASOPHILS # BLD AUTO: 0.03 10*3/MM3 (ref 0–0.2)
BASOPHILS NFR BLD AUTO: 0.3 % (ref 0–1.5)
BLD GP AB SCN SERPL QL: NEGATIVE
DEPRECATED RDW RBC AUTO: 46.7 FL (ref 37–54)
EOSINOPHIL # BLD AUTO: 0.05 10*3/MM3 (ref 0–0.4)
EOSINOPHIL NFR BLD AUTO: 0.4 % (ref 0.3–6.2)
ERYTHROCYTE [DISTWIDTH] IN BLOOD BY AUTOMATED COUNT: 13.9 % (ref 12.3–15.4)
HCT VFR BLD AUTO: 39.6 % (ref 34–46.6)
HGB BLD-MCNC: 13.8 G/DL (ref 12–15.9)
IMM GRANULOCYTES # BLD AUTO: 0.07 10*3/MM3 (ref 0–0.05)
IMM GRANULOCYTES NFR BLD AUTO: 0.6 % (ref 0–0.5)
LYMPHOCYTES # BLD AUTO: 1.62 10*3/MM3 (ref 0.7–3.1)
LYMPHOCYTES NFR BLD AUTO: 14.5 % (ref 19.6–45.3)
MCH RBC QN AUTO: 31.8 PG (ref 26.6–33)
MCHC RBC AUTO-ENTMCNC: 34.8 G/DL (ref 31.5–35.7)
MCV RBC AUTO: 91.2 FL (ref 79–97)
MONOCYTES # BLD AUTO: 0.79 10*3/MM3 (ref 0.1–0.9)
MONOCYTES NFR BLD AUTO: 7.1 % (ref 5–12)
NEUTROPHILS # BLD AUTO: 8.64 10*3/MM3 (ref 1.7–7)
NEUTROPHILS NFR BLD AUTO: 77.1 % (ref 42.7–76)
NRBC BLD AUTO-RTO: 0 /100 WBC (ref 0–0.2)
PLATELET # BLD AUTO: 202 10*3/MM3 (ref 140–450)
PMV BLD AUTO: 11.2 FL (ref 6–12)
RBC # BLD AUTO: 4.34 10*6/MM3 (ref 3.77–5.28)
RH BLD: POSITIVE
SARS-COV-2 RNA PNL SPEC NAA+PROBE: NOT DETECTED
T&S EXPIRATION DATE: NORMAL
WBC NRBC COR # BLD: 11.2 10*3/MM3 (ref 3.4–10.8)

## 2020-06-06 PROCEDURE — 0KQM0ZZ REPAIR PERINEUM MUSCLE, OPEN APPROACH: ICD-10-PCS | Performed by: OBSTETRICS & GYNECOLOGY

## 2020-06-06 PROCEDURE — 25010000002 ONDANSETRON PER 1 MG: Performed by: OBSTETRICS & GYNECOLOGY

## 2020-06-06 PROCEDURE — C1755 CATHETER, INTRASPINAL: HCPCS

## 2020-06-06 PROCEDURE — 25010000002 BUTORPHANOL PER 1 MG: Performed by: OBSTETRICS & GYNECOLOGY

## 2020-06-06 PROCEDURE — C1755 CATHETER, INTRASPINAL: HCPCS | Performed by: ANESTHESIOLOGY

## 2020-06-06 RX ORDER — PHYTONADIONE 1 MG/.5ML
INJECTION, EMULSION INTRAMUSCULAR; INTRAVENOUS; SUBCUTANEOUS
Status: DISPENSED
Start: 2020-06-06 | End: 2020-06-07

## 2020-06-06 RX ORDER — CARBOPROST TROMETHAMINE 250 UG/ML
250 INJECTION, SOLUTION INTRAMUSCULAR AS NEEDED
Status: DISCONTINUED | OUTPATIENT
Start: 2020-06-06 | End: 2020-06-06 | Stop reason: HOSPADM

## 2020-06-06 RX ORDER — OXYCODONE AND ACETAMINOPHEN 10; 325 MG/1; MG/1
1 TABLET ORAL EVERY 4 HOURS PRN
Status: DISCONTINUED | OUTPATIENT
Start: 2020-06-06 | End: 2020-06-07 | Stop reason: HOSPADM

## 2020-06-06 RX ORDER — PRENATAL VIT NO.126/IRON/FOLIC 28MG-0.8MG
1 TABLET ORAL DAILY
Status: DISCONTINUED | OUTPATIENT
Start: 2020-06-06 | End: 2020-06-07 | Stop reason: HOSPADM

## 2020-06-06 RX ORDER — DOCUSATE SODIUM 100 MG/1
100 CAPSULE, LIQUID FILLED ORAL 2 TIMES DAILY
Status: DISCONTINUED | OUTPATIENT
Start: 2020-06-06 | End: 2020-06-07 | Stop reason: HOSPADM

## 2020-06-06 RX ORDER — PROMETHAZINE HYDROCHLORIDE 25 MG/1
25 TABLET ORAL EVERY 6 HOURS PRN
Status: DISCONTINUED | OUTPATIENT
Start: 2020-06-06 | End: 2020-06-07 | Stop reason: HOSPADM

## 2020-06-06 RX ORDER — CALCIUM CARBONATE 200(500)MG
2 TABLET,CHEWABLE ORAL 3 TIMES DAILY PRN
Status: DISCONTINUED | OUTPATIENT
Start: 2020-06-06 | End: 2020-06-07 | Stop reason: HOSPADM

## 2020-06-06 RX ORDER — FAMOTIDINE 10 MG/ML
20 INJECTION, SOLUTION INTRAVENOUS ONCE AS NEEDED
Status: DISCONTINUED | OUTPATIENT
Start: 2020-06-06 | End: 2020-06-06 | Stop reason: HOSPADM

## 2020-06-06 RX ORDER — ONDANSETRON 2 MG/ML
4 INJECTION INTRAMUSCULAR; INTRAVENOUS ONCE AS NEEDED
Status: DISCONTINUED | OUTPATIENT
Start: 2020-06-06 | End: 2020-06-06 | Stop reason: HOSPADM

## 2020-06-06 RX ORDER — ONDANSETRON 4 MG/1
4 TABLET, FILM COATED ORAL EVERY 8 HOURS PRN
Status: DISCONTINUED | OUTPATIENT
Start: 2020-06-06 | End: 2020-06-07 | Stop reason: HOSPADM

## 2020-06-06 RX ORDER — HYDROXYZINE 50 MG/1
50 TABLET, FILM COATED ORAL NIGHTLY PRN
Status: DISCONTINUED | OUTPATIENT
Start: 2020-06-06 | End: 2020-06-07 | Stop reason: HOSPADM

## 2020-06-06 RX ORDER — HYDROCORTISONE 25 MG/G
1 CREAM TOPICAL AS NEEDED
Status: DISCONTINUED | OUTPATIENT
Start: 2020-06-06 | End: 2020-06-07 | Stop reason: HOSPADM

## 2020-06-06 RX ORDER — MISOPROSTOL 200 UG/1
600 TABLET ORAL ONCE AS NEEDED
Status: DISCONTINUED | OUTPATIENT
Start: 2020-06-06 | End: 2020-06-07 | Stop reason: HOSPADM

## 2020-06-06 RX ORDER — PROMETHAZINE HYDROCHLORIDE 25 MG/1
12.5 SUPPOSITORY RECTAL EVERY 6 HOURS PRN
Status: DISCONTINUED | OUTPATIENT
Start: 2020-06-06 | End: 2020-06-07 | Stop reason: HOSPADM

## 2020-06-06 RX ORDER — ERYTHROMYCIN 5 MG/G
OINTMENT OPHTHALMIC
Status: DISPENSED
Start: 2020-06-06 | End: 2020-06-07

## 2020-06-06 RX ORDER — BISACODYL 10 MG
10 SUPPOSITORY, RECTAL RECTAL DAILY PRN
Status: DISCONTINUED | OUTPATIENT
Start: 2020-06-07 | End: 2020-06-07 | Stop reason: HOSPADM

## 2020-06-06 RX ORDER — OXYCODONE HYDROCHLORIDE AND ACETAMINOPHEN 5; 325 MG/1; MG/1
1 TABLET ORAL EVERY 4 HOURS PRN
Status: DISCONTINUED | OUTPATIENT
Start: 2020-06-06 | End: 2020-06-07 | Stop reason: HOSPADM

## 2020-06-06 RX ORDER — ONDANSETRON 2 MG/ML
4 INJECTION INTRAMUSCULAR; INTRAVENOUS EVERY 6 HOURS PRN
Status: DISCONTINUED | OUTPATIENT
Start: 2020-06-06 | End: 2020-06-07 | Stop reason: HOSPADM

## 2020-06-06 RX ORDER — LANOLIN
CREAM (ML) TOPICAL
Status: DISCONTINUED | OUTPATIENT
Start: 2020-06-06 | End: 2020-06-07 | Stop reason: HOSPADM

## 2020-06-06 RX ORDER — DIPHENHYDRAMINE HYDROCHLORIDE 50 MG/ML
12.5 INJECTION INTRAMUSCULAR; INTRAVENOUS EVERY 8 HOURS PRN
Status: DISCONTINUED | OUTPATIENT
Start: 2020-06-06 | End: 2020-06-06 | Stop reason: HOSPADM

## 2020-06-06 RX ORDER — EPHEDRINE SULFATE 50 MG/ML
5 INJECTION, SOLUTION INTRAVENOUS AS NEEDED
Status: DISCONTINUED | OUTPATIENT
Start: 2020-06-06 | End: 2020-06-06 | Stop reason: HOSPADM

## 2020-06-06 RX ORDER — OXYTOCIN-SODIUM CHLORIDE 0.9% IV SOLN 30 UNIT/500ML 30-0.9/5 UT/ML-%
125 SOLUTION INTRAVENOUS CONTINUOUS PRN
Status: COMPLETED | OUTPATIENT
Start: 2020-06-06 | End: 2020-06-06

## 2020-06-06 RX ORDER — IBUPROFEN 800 MG/1
800 TABLET ORAL EVERY 8 HOURS PRN
Status: DISCONTINUED | OUTPATIENT
Start: 2020-06-06 | End: 2020-06-07 | Stop reason: HOSPADM

## 2020-06-06 RX ORDER — PROMETHAZINE HYDROCHLORIDE 25 MG/ML
12.5 INJECTION, SOLUTION INTRAMUSCULAR; INTRAVENOUS EVERY 6 HOURS PRN
Status: DISCONTINUED | OUTPATIENT
Start: 2020-06-06 | End: 2020-06-07 | Stop reason: HOSPADM

## 2020-06-06 RX ORDER — METHYLERGONOVINE MALEATE 0.2 MG/ML
200 INJECTION INTRAVENOUS ONCE AS NEEDED
Status: DISCONTINUED | OUTPATIENT
Start: 2020-06-06 | End: 2020-06-06 | Stop reason: HOSPADM

## 2020-06-06 RX ORDER — MISOPROSTOL 200 UG/1
800 TABLET ORAL AS NEEDED
Status: DISCONTINUED | OUTPATIENT
Start: 2020-06-06 | End: 2020-06-06 | Stop reason: HOSPADM

## 2020-06-06 RX ORDER — VALACYCLOVIR HYDROCHLORIDE 500 MG/1
500 TABLET, FILM COATED ORAL ONCE
Status: COMPLETED | OUTPATIENT
Start: 2020-06-06 | End: 2020-06-06

## 2020-06-06 RX ORDER — BUPIVACAINE HYDROCHLORIDE AND EPINEPHRINE 2.5; 5 MG/ML; UG/ML
INJECTION, SOLUTION EPIDURAL; INFILTRATION; INTRACAUDAL; PERINEURAL AS NEEDED
Status: DISCONTINUED | OUTPATIENT
Start: 2020-06-06 | End: 2020-06-06 | Stop reason: SURG

## 2020-06-06 RX ORDER — OXYTOCIN-SODIUM CHLORIDE 0.9% IV SOLN 30 UNIT/500ML 30-0.9/5 UT/ML-%
250 SOLUTION INTRAVENOUS CONTINUOUS
Status: ACTIVE | OUTPATIENT
Start: 2020-06-06 | End: 2020-06-06

## 2020-06-06 RX ORDER — OXYTOCIN-SODIUM CHLORIDE 0.9% IV SOLN 30 UNIT/500ML 30-0.9/5 UT/ML-%
999 SOLUTION INTRAVENOUS ONCE
Status: COMPLETED | OUTPATIENT
Start: 2020-06-06 | End: 2020-06-06

## 2020-06-06 RX ADMIN — IBUPROFEN 800 MG: 800 TABLET ORAL at 17:34

## 2020-06-06 RX ADMIN — SODIUM CHLORIDE, POTASSIUM CHLORIDE, SODIUM LACTATE AND CALCIUM CHLORIDE 999 ML/HR: 600; 310; 30; 20 INJECTION, SOLUTION INTRAVENOUS at 12:15

## 2020-06-06 RX ADMIN — VALACYCLOVIR 500 MG: 500 TABLET, FILM COATED ORAL at 21:42

## 2020-06-06 RX ADMIN — OXYTOCIN 250 ML/HR: 10 INJECTION, SOLUTION INTRAMUSCULAR; INTRAVENOUS at 15:25

## 2020-06-06 RX ADMIN — BUPIVACAINE HYDROCHLORIDE AND EPINEPHRINE 10 ML: 2.5; 5 INJECTION, SOLUTION EPIDURAL; INFILTRATION; INTRACAUDAL; PERINEURAL at 13:14

## 2020-06-06 RX ADMIN — BUPIVACAINE HYDROCHLORIDE AND EPINEPHRINE 8 ML: 2.5; 5 INJECTION, SOLUTION EPIDURAL; INFILTRATION; INTRACAUDAL; PERINEURAL at 12:21

## 2020-06-06 RX ADMIN — Medication: at 19:45

## 2020-06-06 RX ADMIN — OXYTOCIN 125 ML/HR: 10 INJECTION, SOLUTION INTRAMUSCULAR; INTRAVENOUS at 16:20

## 2020-06-06 RX ADMIN — DOCUSATE SODIUM 100 MG: 100 CAPSULE, LIQUID FILLED ORAL at 21:42

## 2020-06-06 RX ADMIN — BUTORPHANOL TARTRATE 2 MG: 2 INJECTION, SOLUTION INTRAMUSCULAR; INTRAVENOUS at 02:29

## 2020-06-06 RX ADMIN — OXYTOCIN 999 ML/HR: 10 INJECTION, SOLUTION INTRAMUSCULAR; INTRAVENOUS at 15:09

## 2020-06-06 RX ADMIN — Medication 8 ML/HR: at 08:55

## 2020-06-06 RX ADMIN — SODIUM CHLORIDE, POTASSIUM CHLORIDE, SODIUM LACTATE AND CALCIUM CHLORIDE 125 ML/HR: 600; 310; 30; 20 INJECTION, SOLUTION INTRAVENOUS at 00:00

## 2020-06-06 RX ADMIN — ONDANSETRON 4 MG: 2 INJECTION INTRAMUSCULAR; INTRAVENOUS at 02:28

## 2020-06-06 NOTE — ANESTHESIA PROCEDURE NOTES
Labor Epidural    Pre-sedation assessment completed: 6/6/2020 8:39 AM    Patient location during procedure: OB  Start Time: 6/6/2020 8:40 AM  Stop Time: 6/6/2020 8:50 AM  Indication:at surgeon's request  Performed By  Anesthesiologist: Justin Gusman MD  Preanesthetic Checklist  Completed: patient identified, site marked, surgical consent, pre-op evaluation, timeout performed, IV checked, risks and benefits discussed and monitors and equipment checked  Additional Notes  Continuous infusion with Rop. 0.2% + Fent 2 mcg/cc @ 8 ml/hr.  PCA 6 ml q 15 min.  Prep:  Pt Position:sitting  Sterile Tech:cap, gloves, mask and sterile barrier  Prep:chlorhexidine gluconate and isopropyl alcohol  Monitoring:blood pressure monitoring, continuous pulse oximetry and EKG  Epidural Block Procedure:  Approach:midline  Location:L4-L5  Needle Type:Tuohy  Needle Gauge:17  Aspiration:negative  Test Dose:negative  Post Assessment:  Dressing:occlusive dressing applied and secured with tape  Pt Tolerance:patient tolerated the procedure well with no apparent complications  Complications:no

## 2020-06-06 NOTE — PLAN OF CARE
Problem: Patient Care Overview  Goal: Plan of Care Review  Outcome: Ongoing (interventions implemented as appropriate)  Flowsheets (Taken 2020 8722)  Progress: improving  Plan of Care Reviewed With: patient; spouse  Outcome Summary: pt successful , breastfeeding, VSS, bleeding light

## 2020-06-06 NOTE — ANESTHESIA PREPROCEDURE EVALUATION
Anesthesia Evaluation     Patient summary reviewed and Nursing notes reviewed   NPO Solid Status: > 8 hours  NPO Liquid Status: > 2 hours           Airway   Mallampati: II  TM distance: >3 FB  Neck ROM: full  Dental - normal exam     Pulmonary - negative pulmonary ROS and normal exam    breath sounds clear to auscultation  Cardiovascular - negative cardio ROS and normal exam    Rhythm: regular  Rate: normal    (-) angina, orthopnea, PND, SOTELO      Neuro/Psych  (+) psychiatric history Anxiety,     GI/Hepatic/Renal/Endo - negative ROS     Musculoskeletal (-) negative ROS    Abdominal    Substance History - negative use     OB/GYN    (+) Pregnant,         Other - negative ROS                       Anesthesia Plan    ASA 2     epidural       Anesthetic plan, all risks, benefits, and alternatives have been provided, discussed and informed consent has been obtained with: patient.

## 2020-06-06 NOTE — OBED NOTES
[facility] Note OB        Patient Name: Magno Maria  YOB: 1985  MRN: [id number]  Admission Date: 2020 10:33 PM  Date of Service: 2020    Chief Complaint: Contractions (Pt states she has been milagros since 9pm last night. They have gotten worse within 6:30p-7p, they are more frequent and uncomfortable. +FM, no vaginal bleeding, just pink mucous. Pt was seen in office today.)    Subjective     Magno Maria is a 34 y.o. female  at 39w3d with Estimated Date of Delivery: 20 who presents with the chief complaint listed above.  She sees Floridalma Hassan MD for her prenatal care. Her pregnancy has been complicated by: History of prior  section x1 for nonreassuring fetal heart tones and a history of herpes on Valtrex.  The patient plans TOLAC.  This pregnancy has been uncomplicated to this point and her GBS was negative.  The patient reports that she was seen in the office today where she was about 1 cm dilated.  She reports milagros for about 24 hours they became worse about 3 to 4 hours ago.  Her contractions are about 6 to 10 minutes apart and are becoming stronger over time.   She describes fetal movement as normal.  She denies rupture of membranes.  She denies vaginal bleeding.         Objective   Patient Active Problem List    Diagnosis   • Pregnancy [Z34.90]   • Maternal care due to low transverse uterine scar from previous  delivery [O34.211]   • H/O herpes genitalis [Z86.19]        OB History    Para Term  AB Living   2 1 1 0 0 1   SAB TAB Ectopic Molar Multiple Live Births   0 0 0 0 0 1      # Outcome Date GA Lbr Azam/2nd Weight Sex Delivery Anes PTL Lv   2 Current            1 Term 17 40w2d  3575 g (7 lb 14.1 oz) M CS-LTranv EPI N [place]      Birth Comments: panda or 1      Complications: Fetal Intolerance, Placental abruption      Name: STACY MARIA      Apgar1: 9  Apgar5: 9        Past Medical History:   Diagnosis Date   • Abnormal ECG       - checked out from cardiologist.    • Abnormal Pap smear of cervix    • Anxiety    • Gestational diabetes    • Gestational hypertension    • Herpes        Past Surgical History:   Procedure Laterality Date   •  SECTION N/A 2017    Procedure:  SECTION PRIMARY;  Surgeon: Radha Loaiza MD;  Location: [facility] LABOR DELIVERY;  Service:    • WISDOM TOOTH EXTRACTION         No current facility-administered medications on file prior to encounter.      Current Outpatient Medications on File Prior to Encounter   Medication Sig Dispense Refill   • Prenatal Vit-Fe Fumarate-FA (PRENATAL, CLASSIC, VITAMIN) 28-0.8 MG tablet tablet Take 1 tablet by mouth.     • valACYclovir (VALTREX) 500 MG tablet Take 500 mg by mouth 1 (One) Time.     • [DISCONTINUED] amoxicillin-clavulanate (AUGMENTIN) 875-125 MG per tablet Take 1 tablet by mouth 2 (Two) Times a Day. 20 tablet 0   • [DISCONTINUED] ibuprofen (ADVIL,MOTRIN) 600 MG tablet Take 1 tablet by mouth Every 8 (Eight) Hours As Needed for Mild Pain . 50 tablet 3       No Known Allergies    Family History   Problem Relation Age of Onset   • Diabetes Father    • Hypertension Father    • Hyperlipidemia Father    • No Known Problems Mother        Social History     Socioeconomic History   • Marital status:      Spouse name: Not on file   • Number of children: Not on file   • Years of education: Not on file   • Highest education level: Not on file   Tobacco Use   • Smoking status: Never Smoker   • Smokeless tobacco: Never Used   Substance and Sexual Activity   • Alcohol use: Not Currently   • Drug use: No   • Sexual activity: Yes     Partners: Male     Birth control/protection: None           Review of Systems   Constitutional: Negative for chills and fever.   HENT: Negative.    Eyes: Negative for photophobia and visual disturbance.   Respiratory: Negative for cough, chest tightness and shortness of breath.    Cardiovascular: Negative for leg swelling.  "  Gastrointestinal: Positive for abdominal pain. Negative for diarrhea, nausea and vomiting.   Genitourinary: Negative for dysuria, flank pain, hematuria, pelvic pain, vaginal bleeding and vaginal discharge.   Musculoskeletal: Negative for back pain.   Neurological: Negative for dizziness, weakness and headaches.          PHYSICAL EXAM:      VITAL SIGNS:  Vitals:    20 2100 20 2255 20 2305   BP:  139/78    BP Location:  Right arm    Patient Position:  Lying    Pulse:  98    Resp:  18    Temp:  98 °F (36.7 °C)    TempSrc:  Oral    Weight: 67 kg (147 lb 9.6 oz)  67 kg (147 lb 9.6 oz)   Height:   154.9 cm (61\")        FHT'S:                   Baseline:  130 BPM  Variability:  Moderate = 6 - 25 BPM  Accelerations:  15 x 15 accelerations present     Decelerations:  absent  Contractions:   present     Interpretation:   Reactive NST, CAT 1 tracing        PHYSICAL EXAM:    General: well developed; well nourished  no acute distress   Heart: Not performed.   Lungs  : breathing is unlabored   Abdomen: soft, non-tender; no masses  no hepato-splenomegaly       Cervix: Checked by RN  Cervical Dilation (cm): 2  Cervical Effacement: 70%  Fetal Station: -2  Cervical Consistency: soft  Cervical Position: mid-position   Contractions: regular every 5-7 minutes        Extremities: peripheral pulses normal, no pedal edema, no clubbing or cyanosis      LABS AND TESTING ORDERED:  1. Uterine and fetal monitoring  2. Admission for TOLAC    LAB RESULTS:    No results found for this or any previous visit (from the past 24 hour(s)).    Lab Results   Component Value Date    ABO O 2017    RH Positive 2017       Lab Results   Component Value Date    STREPGPB Negative 10/13/2017                 Assessment/Plan        ASSESSMENT/PLAN:  Magno Maria is a 34 y.o. female  at 39w3d who presented with abdominal pain possible uterine contractions.   She was observed for labor and did change her cervix which was noted " to be Cervical Dilation (cm): 2 cm/ Cervical Effacement: 70% % effaced/ vertex at Fetal Station: -2 station on last exam.  The patient reports that she was only about 1 cm dilated at her last office visit.  He is also noted to have a history of a prior  section for nonreassuring fetal heart tracing and plans TOLAC this delivery.  She also has a history of herpes and has been on suppression with Valtrex 500 mg p.o. daily with the last dose taken about 21:45 this evening, and she reports no lesions or prodrome.  She also reported her GBS was negative she was noted to have a  Reactive NST.  CAT 1 tracing.  In view of cervical change from the last visit at the office and a regular strong contractions with a desire to TOLAC I called Dr. Josse BARROS who agreed with admission of the patient for observation at this time.  Anticipated vaginal delivery.    Final Impression:  • Pregnancy at 39w3d  • History of prior  section x1 for fetal indications (nonreassuring fetal tracing) desires TOLAC  • Regular moderate uterine contractions approximately every 5 to 7 minutes  • History of herpes currently on Valtrex suppression 500 mg daily since 36 weeks, denies lesions or prodrome.  Last dose of Valtrex reported about 21:45 this evening  • Reactive NST.  CAT 1 tracing  • Patient reports her group B strep was negative, this was reviewed on her yogi on the phone.  Lab Results   Component Value Date    ABO O 2017    RH Positive 2017     • COVID - 19 status pending  • History of gestational hypertension and gestational diabetes in the previous pregnancy, but denies these in the current pregnancy  • Patient being admitted at this time by Dr. Josse BARROS who will be assuming care of the patient and providing further management.  [place] orders will be placed by Dr. Josse BARROS        I have spent 25 minutes including face to face time with the patient, greater than 50% in discussion of the diagnosis (counseling)  and/or coordination of care.     Tramaine Johnson MD  6/5/2020  23:45  OB Hospitalist  Phone:  [phone]

## 2020-06-06 NOTE — L&D DELIVERY NOTE
[facility]  Vaginal Delivery Note    Patient Name: Magno Maria  :  1985  MRN:  [id number]  DX:    Maternal care due to low transverse uterine scar from previous  delivery    Pregnancy    H/O herpes genitalis      Delivery     Delivery: , Spontaneous     YOB: 2020    Time of Birth: 3:05 PM      Anesthesia: Epidural     Delivering clinician: Akira Hurtado MD       Infant    Findings: Viable female  infant    Infant observations: Weight: 3596 g (7 lb 14.8 oz)        Apgars: 8   @ 1 minute /    9   @ 5 minutes     Placenta, Cord, and Fluid    Placenta delivered  Spontaneous   at  2020  3:09 PM     Cord: 3 vessels  present.   Cord blood obtained: Yes          Repair    Episiotomy: No   Lacerations: 2nd degree   Estimated Blood Loss:   250  mls.            Delivery narrative: Magno Maria is a 34 y.o.  at 39w4d.  Presented in early labor with painful contractions and cervical change. Received epidural anesthesia. Then had  artificial rupture of membranes  at 10:49 AM  on 2020   Progressed normally to C/C/+1 @ 2020  1:36 PM . Labored down until 2020  1:42 PM . Fetal status reassuring throughout.  of viable  female  @ 3:05 PM  over an intact perineum. ASDE. Delayed cord clamping performed and infant placed in kangaroo care.   3596 g (7 lb 14.8 oz) .     Apgar :  8   @ 1 minute / 9   @ 5 minutes.  Placenta delivered spontaneously, intact with 3 vessel cord. Cervix and rectum intact. 2nd degree laceration repaired in usual fashion with 3-0 Monocryl suture. Mother and baby recovering good condition.       Akira Hurtado MD  20  15:37    Note done at time of delivery then refreshed at a later date to include RN documentation not yet completed.

## 2020-06-06 NOTE — H&P
[facility]  Obstetric History and Physical    Patient Name: Magno Maria  :  1985  MRN:  [id number]      Chief Complaint   Patient presents with   • Contractions     Pt states she has been milagros since 9pm last night. They have gotten worse within 6:30p-7p, they are more frequent and uncomfortable. +FM, no vaginal bleeding, just pink mucous. Pt was seen in office today.       Subjective     Patient is a 34 y.o. female  currently at 39w4d, who presents with c/o painful contractions. Desires TOLAC - C/S 2017 for NRFHR.            Objective       Vital Signs Range for the last 24 hours  Temperature: Temp:  [97.7 °F (36.5 °C)-98.1 °F (36.7 °C)] 98.1 °F (36.7 °C)   Temp Source: Temp src: Oral   BP: BP: ()/(46-81) 134/63   Pulse: Heart Rate:  [] 97   Respirations: Resp:  [18] 18                   Physical Examination:     General :  Alert in NAD  Abdomen: Gravid, EFW 7# 8 oz  by [email or web address]    Presentation: vertex   Cervix: Exam by: Method: sterile exam per physician   Dilation: Cervical Dilation (cm): 8   Effacement: Cervical Effacement: 90%   Station: Fetal Station: 0   AROM clear        Fetal Heart Rate Assessment   Method: Fetal HR Assessment Method: external   Beats/min: Fetal HR (beats/min): 125   Baseline: Fetal Heart Baseline Rate: normal range   Varibility: Fetal HR Variability: moderate (amplitude range 6 to 25 bpm)   Accels: Fetal HR Accelerations: greater than/equal to 15 bpm, lasting at least 15 seconds   Decels: Fetal HR Decelerations: absent   Tracing Category:       Uterine Assessment   Method: Method: palpation, external tocotransducer   Frequency (min): Contraction Frequency (Minutes): 1-4   Ctx Count in 10 min:     Duration:     Intensity: Contraction Intensity: moderate by palpation   Intensity by IUPC:     Resting Tone: Uterine Resting Tone: soft by palpation   Resting Tone by IUPC:     Kimberly Units:           Results from last 7 days   Lab Units 20  4354    WBC 10*3/mm3 11.20*   HEMOGLOBIN g/dL 13.8   HEMATOCRIT % 39.6   PLATELETS 10*3/mm3 202       Assessment/Plan     1.  Intrauterine pregnancy at 39w4d weeks gestation labor for TOLAC.  Reassuring fetal status.   Aware of all risks and benefits of TOLAC. Anticipate .  Plan of care has been reviewed with patient and family.  All questions answered.      Pregnancy    Maternal care due to low transverse uterine scar from previous  delivery    H/O herpes genitalis        H&P updated. The patient was examined and the following changes are noted above.         Akira Hurtado MD  2020  11:00

## 2020-06-07 VITALS
HEIGHT: 61 IN | SYSTOLIC BLOOD PRESSURE: 109 MMHG | RESPIRATION RATE: 16 BRPM | HEART RATE: 75 BPM | WEIGHT: 147.6 LBS | BODY MASS INDEX: 27.87 KG/M2 | DIASTOLIC BLOOD PRESSURE: 75 MMHG | TEMPERATURE: 97.9 F

## 2020-06-07 LAB
BASOPHILS # BLD AUTO: 0.05 10*3/MM3 (ref 0–0.2)
BASOPHILS NFR BLD AUTO: 0.4 % (ref 0–1.5)
DEPRECATED RDW RBC AUTO: 46.8 FL (ref 37–54)
EOSINOPHIL # BLD AUTO: 0.23 10*3/MM3 (ref 0–0.4)
EOSINOPHIL NFR BLD AUTO: 1.8 % (ref 0.3–6.2)
ERYTHROCYTE [DISTWIDTH] IN BLOOD BY AUTOMATED COUNT: 13.9 % (ref 12.3–15.4)
HCT VFR BLD AUTO: 33.3 % (ref 34–46.6)
HGB BLD-MCNC: 11.5 G/DL (ref 12–15.9)
IMM GRANULOCYTES # BLD AUTO: 0.05 10*3/MM3 (ref 0–0.05)
IMM GRANULOCYTES NFR BLD AUTO: 0.4 % (ref 0–0.5)
LYMPHOCYTES # BLD AUTO: 1.87 10*3/MM3 (ref 0.7–3.1)
LYMPHOCYTES NFR BLD AUTO: 14.6 % (ref 19.6–45.3)
MCH RBC QN AUTO: 31.9 PG (ref 26.6–33)
MCHC RBC AUTO-ENTMCNC: 34.5 G/DL (ref 31.5–35.7)
MCV RBC AUTO: 92.2 FL (ref 79–97)
MONOCYTES # BLD AUTO: 0.86 10*3/MM3 (ref 0.1–0.9)
MONOCYTES NFR BLD AUTO: 6.7 % (ref 5–12)
NEUTROPHILS # BLD AUTO: 9.72 10*3/MM3 (ref 1.7–7)
NEUTROPHILS NFR BLD AUTO: 76.1 % (ref 42.7–76)
NRBC BLD AUTO-RTO: 0 /100 WBC (ref 0–0.2)
PLATELET # BLD AUTO: 170 10*3/MM3 (ref 140–450)
PMV BLD AUTO: 10.7 FL (ref 6–12)
RBC # BLD AUTO: 3.61 10*6/MM3 (ref 3.77–5.28)
WBC NRBC COR # BLD: 12.78 10*3/MM3 (ref 3.4–10.8)

## 2020-06-07 PROCEDURE — 85025 COMPLETE CBC W/AUTO DIFF WBC: CPT | Performed by: OBSTETRICS & GYNECOLOGY

## 2020-06-07 RX ORDER — IBUPROFEN 800 MG/1
800 TABLET ORAL EVERY 8 HOURS PRN
Qty: 30 TABLET | Refills: 0 | Status: SHIPPED | OUTPATIENT
Start: 2020-06-07

## 2020-06-07 RX ADMIN — IBUPROFEN 800 MG: 800 TABLET ORAL at 01:32

## 2020-06-07 RX ADMIN — IBUPROFEN 800 MG: 800 TABLET ORAL at 10:39

## 2020-06-07 RX ADMIN — DOCUSATE SODIUM 100 MG: 100 CAPSULE, LIQUID FILLED ORAL at 08:36

## 2020-06-07 NOTE — DISCHARGE SUMMARY
Date of Discharge:  2020    Discharge Diagnosis: s/p [facility]    Presenting Problem/History of Present Illness  Pregnancy [Z34.90]  Vaginal birth after  section [O34.219]       Hospital Course  Patient is a 34 y.o. female presented with in labor/.      Procedures Performed  Procedure(s):   SECTION REPEAT       Consults:   Consults     No orders found from 2020 to 2020.          Condition on Discharge:   Subjective   Postpartum Day 2 Vaginal Delivery.    The patient feels well without complaints.    Vital Signs  Temp:  [97.8 °F (36.6 °C)-98.6 °F (37 °C)] 97.9 °F (36.6 °C)  Heart Rate:  [] 75  Resp:  [14-18] 16  BP: ()/(46-75) 109/75    Physical Exam:   General: Awake and alert   Abdomen: Fundus: firm, non tender    Extremities:  Calves NT bilaterally    Assessment/Plan     PPD2  S/P  -   Stable for discharge. Instructions reviewed      Discharge Disposition      Discharge Medications     Discharge Medications      ASK your doctor about these medications      Instructions Start Date   prenatal (CLASSIC) vitamin 28-0.8 MG tablet tablet   1 tablet, Oral      valACYclovir 500 MG tablet  Commonly known as:  VALTREX   500 mg, Oral, Once               The patient has been prescribed a controlled substance.  She has been counseled on the risks associated with using the medication.   The addictive potential of this medication and alternatives were discussed carefully with this patient and she demonstrated understanding.  A VINH report has been obtained and reviewed.       Activity at Discharge: restrictions reviewed    Follow-up Appointments  No future appointments.      Test Results Pending at Discharge       Radha Loaiza MD  20  09:49

## 2020-06-07 NOTE — PLAN OF CARE
Problem: Patient Care Overview  Goal: Plan of Care Review  Outcome: Ongoing (interventions implemented as appropriate)  Flowsheets (Taken 6/7/2020 0109)  Progress: improving  Plan of Care Reviewed With: patient  Outcome Summary: VSS; pain well controlled with motrin, using ice packs, tucks pads, dermoplast, and waffle cushion to perineum; breastfeeding well; wants early D/C  today

## 2020-06-07 NOTE — LACTATION NOTE
P2T, experienced breastfeeder. Mom states baby's latch has been shallow and painful. Her nipples are cracked. Baby does have tongue tie, heart shaped tongue and cupping of the tongue. Helped mom to widen baby's gape with downward pressure on the lower gum with mom's breast tissue. Deep latch was achieved with nutritive suckling. Discussed importance of deep latch and how to achieve it. Encouraged mom to call with next feeding if needing latch assistance.

## 2021-04-16 ENCOUNTER — BULK ORDERING (OUTPATIENT)
Dept: CASE MANAGEMENT | Facility: OTHER | Age: 36
End: 2021-04-16

## 2021-04-16 DIAGNOSIS — Z23 IMMUNIZATION DUE: ICD-10-CM

## (undated) DEVICE — NDL BLNT 18G 1 1/2IN

## (undated) DEVICE — 3M™ MEDIPORE™ H SOFT CLOTH SURGICAL TAPE 2864, 4 INCH X 10 YARD (10CM X 9,14M), 12 ROLLS/CASE: Brand: 3M™ MEDIPORE™

## (undated) DEVICE — SUT GUT CHRM 3/0 CT1 3/0 36IN 922H

## (undated) DEVICE — GLV SURG BIOGEL LTX PF 6 1/2

## (undated) DEVICE — SUT MNCRYL PLS ANTIB UD 4/0 PS2 18IN

## (undated) DEVICE — SUT MNCRYL 0/0 CTX 36IN Y398H

## (undated) DEVICE — KT ART BLD GAS QUICK DRAW

## (undated) DEVICE — ADHS SKIN DERMABOND TOPICAL HI VISC

## (undated) DEVICE — SOL IRR H2O BTL 1000ML STRL

## (undated) DEVICE — SUT PDS 0 CTX 36IN DYED Z370T

## (undated) DEVICE — SUT GUT CHRM 0 CT 27IN 914H